# Patient Record
Sex: FEMALE | Race: WHITE
[De-identification: names, ages, dates, MRNs, and addresses within clinical notes are randomized per-mention and may not be internally consistent; named-entity substitution may affect disease eponyms.]

---

## 2017-07-08 ENCOUNTER — HOSPITAL ENCOUNTER (INPATIENT)
Dept: HOSPITAL 31 - C.ER | Age: 47
LOS: 3 days | Discharge: HOME | DRG: 301 | End: 2017-07-11
Attending: INTERNAL MEDICINE | Admitting: INTERNAL MEDICINE
Payer: MEDICAID

## 2017-07-08 DIAGNOSIS — R00.2: ICD-10-CM

## 2017-07-08 DIAGNOSIS — J98.01: ICD-10-CM

## 2017-07-08 DIAGNOSIS — Z90.710: ICD-10-CM

## 2017-07-08 DIAGNOSIS — I10: ICD-10-CM

## 2017-07-08 DIAGNOSIS — E05.90: Primary | ICD-10-CM

## 2017-07-08 DIAGNOSIS — R07.89: ICD-10-CM

## 2017-07-08 LAB
ALBUMIN SERPL-MCNC: 3.6 G/DL (ref 3.5–5)
ALBUMIN/GLOB SERPL: 1 {RATIO} (ref 1–2.1)
ALT SERPL-CCNC: 27 U/L (ref 9–52)
APTT BLD: 29 SECONDS (ref 21–34)
AST SERPL-CCNC: 26 U/L (ref 14–36)
BASOPHILS # BLD AUTO: 0 K/UL (ref 0–0.2)
BASOPHILS NFR BLD: 0.3 % (ref 0–2)
BILIRUB UR-MCNC: NEGATIVE MG/DL
BUN SERPL-MCNC: 10 MG/DL (ref 7–17)
CALCIUM SERPL-MCNC: 8.8 MG/DL (ref 8.6–10.4)
D DIMER: < 200 NG/MLDDU (ref 0–243)
EOSINOPHIL # BLD AUTO: 0.1 K/UL (ref 0–0.7)
EOSINOPHIL NFR BLD: 1.9 % (ref 0–4)
ERYTHROCYTE [DISTWIDTH] IN BLOOD BY AUTOMATED COUNT: 13.1 % (ref 11.5–14.5)
GFR NON-AFRICAN AMERICAN: > 60
GLUCOSE UR STRIP-MCNC: NORMAL MG/DL
HCG,QUALITATIVE URINE: NEGATIVE
HGB BLD-MCNC: 12.7 G/DL (ref 11–16)
INR PPP: 1
LEUKOCYTE ESTERASE UR-ACNC: (no result) LEU/UL
LYMPHOCYTES # BLD AUTO: 3.2 K/UL (ref 1–4.3)
LYMPHOCYTES NFR BLD AUTO: 49.4 % (ref 20–40)
MCH RBC QN AUTO: 30.5 PG (ref 27–31)
MCHC RBC AUTO-ENTMCNC: 33.3 G/DL (ref 33–37)
MCV RBC AUTO: 91.4 FL (ref 81–99)
MONOCYTES # BLD: 0.7 K/UL (ref 0–0.8)
MONOCYTES NFR BLD: 10 % (ref 0–10)
NEUTROPHILS # BLD: 2.5 K/UL (ref 1.8–7)
NEUTROPHILS NFR BLD AUTO: 38.4 % (ref 50–75)
NRBC BLD AUTO-RTO: 0.1 % (ref 0–2)
PH UR STRIP: 6 [PH] (ref 5–8)
PLATELET # BLD: 169 K/UL (ref 130–400)
PMV BLD AUTO: 9 FL (ref 7.2–11.7)
PROT UR STRIP-MCNC: NEGATIVE MG/DL
PROTHROMBIN TIME: 11.3 SECONDS (ref 9.7–12.2)
RBC # BLD AUTO: 4.17 MIL/UL (ref 3.8–5.2)
RBC # UR STRIP: NEGATIVE /UL
SP GR UR STRIP: 1.02 (ref 1–1.03)
SQUAMOUS EPITHIAL: 1 /HPF (ref 0–5)
URINE NITRATE: NEGATIVE
UROBILINOGEN UR-MCNC: NORMAL MG/DL (ref 0.2–1)
WBC # BLD AUTO: 6.5 K/UL (ref 4.8–10.8)

## 2017-07-09 LAB
CK MB SERPL-MCNC: 0.45 NG/ML (ref 0–3.38)
CK MB SERPL-MCNC: 0.65 NG/ML (ref 0–3.38)

## 2017-07-09 RX ADMIN — PANTOPRAZOLE SODIUM SCH MG: 40 TABLET, DELAYED RELEASE ORAL at 09:55

## 2017-07-09 RX ADMIN — ENOXAPARIN SODIUM SCH MG: 40 INJECTION SUBCUTANEOUS at 09:55

## 2017-07-09 NOTE — CP.PCM.HP
Past Patient History





- Past Social History


Smoking Status: Never Smoked





- CARDIAC


Hx Hypertension: No





- ENDOCRINE/METABOLIC


Hx Hyperthyroidism: Yes





- MUSCULOSKELETAL/RHEUMATOLOGICAL


Hx Falls: No





- GASTROINTESTINAL


Hx Constipation: Yes





- GENITOURINARY/GYNECOLOGICAL


Other/Comment: hysterectomy 1999





- PSYCHIATRIC


Hx Substance Use: No





- SURGICAL HISTORY


Hx Hysterectomy: Yes





- ANESTHESIA


Hx Anesthesia: Yes


Hx Anesthesia Reactions: No





Meds


Allergies/Adverse Reactions: 


 Allergies











Allergy/AdvReac Type Severity Reaction Status Date / Time


 


No Known Allergies Allergy   Verified 11/03/15 09:04














Physical Exam





- Constitutional


Appears: Well





- Head Exam


Head Exam: ATRAUMATIC, NORMAL INSPECTION, NORMOCEPHALIC





- Eye Exam


Eye Exam: EOMI, Normal appearance, PERRL


Pupil Exam: NORMAL ACCOMODATION, PERRL





- ENT Exam


ENT Exam: Mucous Membranes Moist, Normal Exam





- Neck Exam


Neck exam: Positive for: Normal Inspection





- Respiratory Exam


Respiratory Exam: Decreased Breath Sounds





- Cardiovascular Exam


Cardiovascular Exam: REGULAR RHYTHM, +S1, +S2





- GI/Abdominal Exam


GI & Abdominal Exam: Diminished Bowel Sounds, Soft





- Rectal Exam


Rectal Exam: Deferred





Results





- Vital Signs


Recent Vital Signs: 





 Last Vital Signs











Temp  98.2 F   07/09/17 08:52


 


Pulse  68   07/09/17 11:41


 


Resp  20   07/09/17 08:52


 


BP  136/89   07/09/17 08:52


 


Pulse Ox  97   07/09/17 08:52














- Labs


Result Diagrams: 


 07/08/17 22:09





 07/08/17 22:09


Labs: 





 Laboratory Results - last 24 hr











  07/09/17 07/09/17





  06:59 11:33


 


Total Creatine Kinase  72  73


 


CK-MB (Mass)  0.45  0.65


 


Troponin I, Quant  < 0.0120  < 0.0120


 


TSH 3rd Generation  < 0.02 L 














Assessment & Plan





- Assessment and Plan (Free Text)


Plan: 





Aspirin


Lovenox


Protonix


Continue home medication


Cardiology follow-up


Cardiac enzymes every 8


Increase methimazole to 10 mg p.o. twice daily


Free T4 T3 tomorrow morning


Endo as needed


Patient advised to see endocrinologist as an outpatient for possible radio 

iodine uptake patient is already well known case of hypothyroidism

## 2017-07-09 NOTE — CP.PCM.CON
History of Present Illness





- History of Present Illness


History of Present Illness: 





Renata Cortés 47-year-old  female with past medical history 

significant for hyperthyroidism and hypertension who presented with complaints 

of palpitations which started on Saturday afternoon around 3:00 pm .  According 

to the patient she took her dose of propanolol but her symptoms got 

progressively worse for which she came to the ER.


According to the patient she has been on propranolol and thyroid medications 

for the last 8-9 years.  She was recently prescribed diuretics by her 

endocrinologist 1 month ago, but she did not take it.  She also has ongoing 

worsening lower extremity swelling which have been there for the last 7 years 

worse at the end of the day.  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

  According to the patient she denied having any chest pains.  She does have 1-

2 pillow orthopnea and does have NYHA functional class I to II dyspnea.  She 

noticed her symptoms of dyspnea on exertion got worse over the course of last 

day.





Review of Systems





- Constitutional


Constitutional: Fatigue





- EENT


Eyes: As Per HPI


Ears: As Per HPI


Nose/Mouth/Throat: As Per HPI





- Cardiovascular


Cardiovascular: Dyspnea on Exertion, Edema, Leg Edema, Orthopnea, Rapid Heart 

Rate





- Respiratory


Respiratory: Dyspnea on Exertion





- Gastrointestinal


Gastrointestinal: As Per HPI





- Genitourinary


Genitourinary: As Per HPI





- Musculoskeletal


Musculoskeletal: As Per HPI





- Integumentary


Integumentary: As Per HPI





- Neurological


Neurological: As Per HPI





- Psychiatric


Psychiatric: As Per HPI





- Endocrine


Endocrine: Heat Intolorance, Palpitations





Past Patient History





- Past Medical History & Family History


Past Medical History?: Yes


Pertinent Family History: 





She has history of premature coronary artery disease in the family mom had MI 

at the age of 60 and dad had MI at the age of 42.  9 siblings out of which 2 

are  brother  from motor vehicle accident and sister had HIV.  Out 

of the other 7 siblings will be one sister has history of murmurs.  She is 

 with 4 kids in the age of 30 [id number] and 23.





- Past Social History


Smoking Status: Never Smoked


Alcohol: None


Drugs: Denies


Home Situation {Lives}: With Family





- CARDIAC


Hx Cardiac Disorders: No


Hx Angina: No


Hx Atrial Fibrillation: No


Hx Cardia Arrhythmia: Yes


Hx Circulatory Problems: No


Hx Congestive Heart Failure: No


Hx Heart Attack: No


Hx Hypertension: No


Hx Hypotension: No


Hx Internal Defibrillator: No


Hx Mitral Valve Prolapse: No


Hx Pacemaker: No


Hx Peripheral Edema: Yes


Hx Peripheral Vascular Disease: No





- PULMONARY


Hx Respiratory Disorders: No


Hx Asthma: No


Hx Bronchitis: No


Hx Lung Cancer: No





- ENDOCRINE/METABOLIC


Hx Endocrine Disorders: Yes


Hx Hyperthyroidism: Yes





- MUSCULOSKELETAL/RHEUMATOLOGICAL


Hx Falls: No





- GASTROINTESTINAL


Hx Constipation: Yes





- GENITOURINARY/GYNECOLOGICAL


Other/Comment: hysterectomy 1999





- PSYCHIATRIC


Hx Substance Use: No





- SURGICAL HISTORY


Hx Hysterectomy: Yes





- ANESTHESIA


Hx Anesthesia: Yes


Hx Anesthesia Reactions: No





Meds


Allergies/Adverse Reactions: 


 Allergies











Allergy/AdvReac Type Severity Reaction Status Date / Time


 


No Known Allergies Allergy   Verified 11/03/15 09:04














- Medications


Medications: 


 Current Medications





Aspirin (Aspirin)  325 mg PO DAILY [facility]


   Last Admin: 17 09:55 Dose:  325 mg


Enoxaparin Sodium (Lovenox)  40 mg SC DAILY [facility]


   Last Admin: 17 09:55 Dose:  40 mg


Methimazole (Tapazole)  10 mg PO BID [facility]


   Last Admin: 17 17:50 Dose:  10 mg


Pantoprazole Sodium (Protonix Ec Tab)  40 mg PO DAILY [facility]


   Last Admin: 17 09:55 Dose:  40 mg


Propranolol HCl (Inderal)  20 mg PO TID [facility]


   Last Admin: 17 17:49 Dose:  20 mg











Physical Exam





- Constitutional


Appears: Well, No Acute Distress





- Head Exam


Head Exam: ATRAUMATIC, NORMAL INSPECTION, NORMOCEPHALIC





- Eye Exam


Eye Exam: EOMI, Normal appearance


Pupil Exam: NORMAL ACCOMODATION, PERRL





- ENT Exam


ENT Exam: Mucous Membranes Moist, Normal Exam





- Neck Exam


Neck exam: Positive for: Normal Inspection





- Respiratory Exam


Respiratory Exam: Clear to Auscultation Bilateral, NORMAL BREATHING PATTERN





- Cardiovascular Exam


Cardiovascular Exam: Tachycardia, REGULAR RHYTHM, +S1, +S2, Systolic Murmur





- GI/Abdominal Exam


GI & Abdominal Exam: Normal Bowel Sounds, Soft





- Rectal Exam


Rectal Exam: Deferred





- Extremities Exam


Extremities exam: Positive for: normal capillary refill, normal inspection





- Back Exam


Back exam: NORMAL INSPECTION





- Neurological Exam


Neurological exam: Alert, CN II-XII Intact, Oriented x3





- Psychiatric Exam


Psychiatric exam: Normal Affect, Normal Mood





- Skin


Skin Exam: Normal Color, Warm





Results





- Vital Signs


Recent Vital Signs: 


 Last Vital Signs











Temp  98 F   17 16:16


 


Pulse  61   17 16:16


 


Resp  20   17 16:16


 


BP  143/85   17 16:16


 


Pulse Ox  99   17 16:16














- Labs


Result Diagrams: 


 17 22:09





 17 22:09


Labs: 


 Laboratory Results - last 24 hr











  17





  06:59 11:33


 


Total Creatine Kinase  72  73


 


CK-MB (Mass)  0.45  0.65


 


Troponin I, Quant  < 0.0120  < 0.0120


 


TSH 3rd Generation  < 0.02 L 














Assessment & Plan


(1) Palpitation


Assessment and Plan: 


Etiology of palpitations question secondary to hyperthyroidism versus some 

underlying atrial arrhythmia.


We will continue to monitor patient on telemetry.  Continue


We will continue home dose of propanolol 20 mg p.o. 3 times daily.





Status: Acute   





(2) SOB (shortness of breath)


Assessment and Plan: 


Etiology of shortness of breath question secondary to underlying diastolic CHF 

in the setting of tachycardia.  Will check echocardiogram.


Status: Acute   





(3) HTN (hypertension)


Assessment and Plan: 


Blood pressure mildly elevated.


Continue her on home dose of propranolol.


Depending on patient's blood pressure trend will decide if she would benefit 

from any further medications.


Status: Acute   





(4) Hyperthyroidism


Assessment and Plan: 


Check TSH free T3 and T4.


We will also check fasting lipid profile.


Status: Acute

## 2017-07-09 NOTE — RAD
PROCEDURE:  CHEST RADIOGRAPH, 1 VIEW



HISTORY:

chest pain



COMPARISON:

05/09/2016



FINDINGS:



LUNGS:

Mild venous congestion. Prominent productive change at the ends of 

the 1st ribs. Bibasilar breast and nipple shadows.  Right hilar 

prominence.



PLEURA:

No pneumothorax or pleural fluid seen.



CARDIOVASCULAR:

Normal.



OSSEOUS STRUCTURES:

Degenerative changes.



VISUALIZED UPPER ABDOMEN:

Normal.



OTHER FINDINGS:

None. 



IMPRESSION:

Mild venous congestion. Prominent productive change at the ends of 

the 1st ribs. Bibasilar breast and nipple shadows.  Right hilar 

prominence.

## 2017-07-10 VITALS — RESPIRATION RATE: 20 BRPM

## 2017-07-10 LAB
ALBUMIN SERPL-MCNC: 3.6 G/DL (ref 3.5–5)
ALBUMIN/GLOB SERPL: 1.1 {RATIO} (ref 1–2.1)
ALT SERPL-CCNC: 32 U/L (ref 9–52)
AST SERPL-CCNC: 26 U/L (ref 14–36)
BNP SERPL-MCNC: 289 PG/ML (ref 0–450)
BUN SERPL-MCNC: 7 MG/DL (ref 7–17)
CALCIUM SERPL-MCNC: 9.3 MG/DL (ref 8.6–10.4)
ERYTHROCYTE [DISTWIDTH] IN BLOOD BY AUTOMATED COUNT: 13.1 % (ref 11.5–14.5)
GFR NON-AFRICAN AMERICAN: > 60
HGB BLD-MCNC: 14 G/DL (ref 11–16)
MCH RBC QN AUTO: 30.5 PG (ref 27–31)
MCHC RBC AUTO-ENTMCNC: 33.7 G/DL (ref 33–37)
MCV RBC AUTO: 90.6 FL (ref 81–99)
PLATELET # BLD: 156 K/UL (ref 130–400)
PMV BLD AUTO: 9.4 FL (ref 7.2–11.7)
RBC # BLD AUTO: 4.59 MIL/UL (ref 3.8–5.2)
T3: 2.76 NMOL/L (ref 1.49–2.6)
T4 SERPL-MCNC: 13.6 UG/DL (ref 5.5–11)
WBC # BLD AUTO: 5.8 K/UL (ref 4.8–10.8)

## 2017-07-10 RX ADMIN — PANTOPRAZOLE SODIUM SCH MG: 40 TABLET, DELAYED RELEASE ORAL at 10:03

## 2017-07-10 RX ADMIN — ENOXAPARIN SODIUM SCH MG: 40 INJECTION SUBCUTANEOUS at 10:03

## 2017-07-10 NOTE — CP.PCM.PN
<Sera Ulrich - Last Filed: 07/10/17 19:34>





Subjective





- Date & Time of Evaluation


Date of Evaluation: 07/10/17


Time of Evaluation: 19:34





- Subjective


Subjective: 





PGY2 progress note for Dr. Shah





Pt is seen and examined at bedside.  No acute events overnight.  Patient denies 

having any CP, SOB, abd pain, N/V/D/C, palpitations.  Patient is tolerating 

diet and having regular BMs.  12 point ros are negative except for the above 

mentioned.  





Objective





- Vital Signs/Intake and Output


Vital Signs (last 24 hours): 


 











Temp Pulse Resp BP Pulse Ox


 


 98.4 F   63   20   162/95 H  99 


 


 07/10/17 15:13  07/10/17 16:00  07/10/17 15:13  07/10/17 15:13  07/10/17 15:13











- Medications


Medications: 


 Current Medications





Aspirin (Aspirin)  325 mg PO DAILY [facility]


   Last Admin: 07/10/17 10:03 Dose:  325 mg


Enoxaparin Sodium (Lovenox)  40 mg SC DAILY [facility]


   Last Admin: 07/10/17 10:03 Dose:  40 mg


Methimazole (Tapazole)  10 mg PO BID [facility]


   Last Admin: 07/10/17 17:09 Dose:  10 mg


Pantoprazole Sodium (Protonix Ec Tab)  40 mg PO DAILY [facility]


   Last Admin: 07/10/17 10:03 Dose:  40 mg


Propranolol HCl (Inderal)  20 mg PO TID [facility]


   Last Admin: 07/10/17 17:10 Dose:  20 mg











- Labs


Labs: 


 





 07/10/17 11:49 





 07/10/17 11:49 





 











PT  11.3 SECONDS (9.7-12.2)   07/08/17  22:25    


 


INR  1.0   07/08/17  22:25    


 


APTT  29 SECONDS (21-34)   07/08/17  22:25    














- Constitutional


Appears: Non-toxic, No Acute Distress





- Head Exam


Head Exam: ATRAUMATIC





- Eye Exam


Eye Exam: EOMI





- ENT Exam


ENT Exam: Mucous Membranes Moist





- Respiratory Exam


Respiratory Exam: Clear to Ausculation Bilateral.  absent: Rales, Rhonchi, 

Wheezes





- Cardiovascular Exam


Cardiovascular Exam: REGULAR RHYTHM.  absent: Gallop, Rubs, +S1, +S2, Murmur





- GI/Abdominal Exam


GI & Abdominal Exam: Soft, Normal Bowel Sounds.  absent: Distended, Firm, 

Guarding, Rigid, Tenderness, Organomegaly





- Extremities Exam


Extremities Exam: absent: Pedal Edema, Tenderness





- Neurological Exam


Neurological Exam: Alert, Awake, Oriented x3





- Psychiatric Exam


Psychiatric exam: Normal Affect, Normal Mood





- Skin


Skin Exam: Dry, Intact, Normal Color, Warm





Assessment and Plan





- Assessment and Plan (Free Text)


Assessment: 





47 [place] old female with past medical history of hyperthyroidism currently on 

methamazole and HTN is admitted for CP and palpitations. Troponins x 3 were 

negative. CXR on admission showed mild venous congestions.  Cardiology was 

consulted and stated that palpitations are likely due to hyperthyroidism.  On 

blood work, TSH was found to be <.02 and T4 was 13.6, free T4 was 1.96. 





Hyperthyroidism


- Continue Methimazole 10 mg po BID and Propanolol 20 mg po TID


- Pt follows with endocrinologist Dr. Delcid  outpatient





CP r/o ACS


- troponin x 3 negative and no ST changes on EKG so ACS less likely 





Prophylaxis


- protonix


- lovenox  





Case discussed with attending, Dr. Shah  





<Go Shah S - Last Filed: 07/10/17 20:12>





Objective





- Vital Signs/Intake and Output


Vital Signs (last 24 hours): 


 











Temp Pulse Resp BP Pulse Ox


 


 98.4 F   63   20   162/95 H  99 


 


 07/10/17 15:13  07/10/17 16:00  07/10/17 15:13  07/10/17 15:13  07/10/17 15:13











- Medications


Medications: 


 Current Medications





Aspirin (Aspirin)  325 mg PO DAILY [facility]


   Last Admin: 07/10/17 10:03 Dose:  325 mg


Enoxaparin Sodium (Lovenox)  40 mg SC DAILY [facility]


   Last Admin: 07/10/17 10:03 Dose:  40 mg


Methimazole (Tapazole)  10 mg PO BID [facility]


   Last Admin: 07/10/17 17:09 Dose:  10 mg


Pantoprazole Sodium (Protonix Ec Tab)  40 mg PO DAILY [facility]


   Last Admin: 07/10/17 10:03 Dose:  40 mg


Propranolol HCl (Inderal)  20 mg PO TID [facility]


   Last Admin: 07/10/17 17:10 Dose:  20 mg











- Labs


Labs: 


 





 07/10/17 11:49 





 07/10/17 11:49 





 











PT  11.3 SECONDS (9.7-12.2)   07/08/17  22:25    


 


INR  1.0   07/08/17  22:25    


 


APTT  29 SECONDS (21-34)   07/08/17  22:25    














Attending/Attestation





- Attestation


I have personally seen and examined this patient.: Yes


I have fully participated in the care of the patient.: Yes


I have reviewed all pertinent clinical information, including history, physical 

exam and plan: Yes


Notes (Text): 





07/10/17 20:09


Patient is admitted with the chest pain and palpitations thyroid level is very 

low patient is already on methimazole increased cardiology follow-up

## 2017-07-10 NOTE — CARD
--------------- APPROVED REPORT --------------





EKG Measurement

Heart [id number]

NH [id number]

[id number]

[id number]

[id number]



<Conclusion>

Normal sinus rhythm

Normal ECG

## 2017-07-10 NOTE — CP.PCM.PN
Subjective





- Date & Time of Evaluation


Date of Evaluation: 07/10/17


Time of Evaluation: 13:00





- Subjective


Subjective: 


clinically same





Objective





- Vital Signs/Intake and Output


Vital Signs (last 24 hours): 


 











Temp Pulse Resp BP Pulse Ox


 


 98.4 F   66   20   162/95 H  99 


 


 07/10/17 15:13  07/10/17 15:13  07/10/17 15:13  07/10/17 15:13  07/10/17 15:13











- Medications


Medications: 


 Current Medications





Aspirin (Aspirin)  325 mg PO DAILY [facility]


   Last Admin: 07/10/17 10:03 Dose:  325 mg


Enoxaparin Sodium (Lovenox)  40 mg SC DAILY [facility]


   Last Admin: 07/10/17 10:03 Dose:  40 mg


Methimazole (Tapazole)  10 mg PO BID [facility]


   Last Admin: 07/10/17 10:03 Dose:  10 mg


Pantoprazole Sodium (Protonix Ec Tab)  40 mg PO DAILY [facility]


   Last Admin: 07/10/17 10:03 Dose:  40 mg


Propranolol HCl (Inderal)  20 mg PO TID [facility]


   Last Admin: 07/10/17 14:19 Dose:  20 mg











- Labs


Labs: 


 





 07/10/17 11:49 





 07/10/17 11:49 





 











PT  11.3 SECONDS (9.7-12.2)   07/08/17  22:25    


 


INR  1.0   07/08/17  22:25    


 


APTT  29 SECONDS (21-34)   07/08/17  22:25    














- Constitutional


Appears: Well





- Head Exam


Head Exam: ATRAUMATIC, NORMAL INSPECTION, NORMOCEPHALIC





- Eye Exam


Eye Exam: EOMI, Normal appearance, PERRL


Pupil Exam: NORMAL ACCOMODATION, PERRL





- ENT Exam


ENT Exam: Mucous Membranes Moist, Normal Exam





- Neck Exam


Neck Exam: Full ROM, Normal Inspection.  absent: Lymphadenopathy





- Respiratory Exam


Respiratory Exam: Decreased Breath Sounds





- Cardiovascular Exam


Cardiovascular Exam: REGULAR RHYTHM, +S1, +S2





- GI/Abdominal Exam


GI & Abdominal Exam: Soft, Diminished Bowel Sounds





- Rectal Exam


Rectal Exam: Deferred

## 2017-07-10 NOTE — CP.PCM.PN
Subjective





- Date & Time of Evaluation


Date of Evaluation: 07/10/17


Time of Evaluation: 17:25





- Subjective


Subjective: 





feeling better


HR stable 








Objective





- Vital Signs/Intake and Output


Vital Signs (last 24 hours): 


 











Temp Pulse Resp BP Pulse Ox


 


 98.4 F   63   20   162/95 H  99 


 


 07/10/17 15:13  07/10/17 16:00  07/10/17 15:13  07/10/17 15:13  07/10/17 15:13











- Medications


Medications: 


 Current Medications





Aspirin (Aspirin)  325 mg PO DAILY [facility]


   Last Admin: 07/10/17 10:03 Dose:  325 mg


Enoxaparin Sodium (Lovenox)  40 mg SC DAILY [facility]


   Last Admin: 07/10/17 10:03 Dose:  40 mg


Methimazole (Tapazole)  10 mg PO BID [facility]


   Last Admin: 07/10/17 10:03 Dose:  10 mg


Pantoprazole Sodium (Protonix Ec Tab)  40 mg PO DAILY [facility]


   Last Admin: 07/10/17 10:03 Dose:  40 mg


Propranolol HCl (Inderal)  20 mg PO TID [facility]


   Last Admin: 07/10/17 14:19 Dose:  20 mg











- Labs


Labs: 


 





 07/10/17 11:49 





 07/10/17 11:49 





 











PT  11.3 SECONDS (9.7-12.2)   07/08/17  22:25    


 


INR  1.0   07/08/17  22:25    


 


APTT  29 SECONDS (21-34)   07/08/17  22:25    














- Constitutional


Appears: Well, No Acute Distress





- Head Exam


Head Exam: ATRAUMATIC, NORMAL INSPECTION, NORMOCEPHALIC





- Eye Exam


Eye Exam: EOMI, Normal appearance, PERRL


Pupil Exam: NORMAL ACCOMODATION





- ENT Exam


ENT Exam: Mucous Membranes Moist, Normal Exam





- Neck Exam


Neck Exam: Full ROM, Normal Inspection





- Respiratory Exam


Respiratory Exam: Clear to Ausculation Bilateral, NORMAL BREATHING PATTERN





- Cardiovascular Exam


Cardiovascular Exam: REGULAR RHYTHM, +S1, +S2.  absent: Murmur





- GI/Abdominal Exam


GI & Abdominal Exam: Soft, Normal Bowel Sounds.  absent: Tenderness





- Rectal Exam


Rectal Exam: Deferred





- Extremities Exam


Extremities Exam: Full ROM, Normal Capillary Refill, Normal Inspection.  absent

: Joint Swelling, Pedal Edema





- Back Exam


Back Exam: NORMAL INSPECTION





- Neurological Exam


Neurological Exam: Alert, Awake, CN II-XII Intact, Normal Gait, Oriented x3





- Psychiatric Exam


Psychiatric exam: Normal Affect, Normal Mood





- Skin


Skin Exam: Intact, Normal Color, Warm





Assessment and Plan


(1) Palpitation


Assessment & Plan: 


secondary to hyperthyroidism


HR stable


echo reviewed - normal LVEF 


Status: Acute   





(2) SOB (shortness of breath)


Assessment & Plan: 


2' to bronchospasm from hyperthyroidism


cont to monitor


outpt ETT 


Status: Acute   





(3) HTN (hypertension)


Assessment & Plan: 


BP stable


cont propranolol 


Status: Acute   





(4) Hyperthyroidism


Assessment & Plan: 


uncontrolled 


methimazole to be adjusted per endo 


Status: Acute

## 2017-07-11 VITALS — TEMPERATURE: 98 F | HEART RATE: 69 BPM | DIASTOLIC BLOOD PRESSURE: 88 MMHG | SYSTOLIC BLOOD PRESSURE: 141 MMHG

## 2017-07-11 LAB
ALBUMIN SERPL-MCNC: 3.9 G/DL (ref 3.5–5)
ALBUMIN/GLOB SERPL: 1.1 {RATIO} (ref 1–2.1)
ALT SERPL-CCNC: 35 U/L (ref 9–52)
AST SERPL-CCNC: 31 U/L (ref 14–36)
BUN SERPL-MCNC: 10 MG/DL (ref 7–17)
CALCIUM SERPL-MCNC: 9.7 MG/DL (ref 8.6–10.4)
ERYTHROCYTE [DISTWIDTH] IN BLOOD BY AUTOMATED COUNT: 12.8 % (ref 11.5–14.5)
GFR NON-AFRICAN AMERICAN: > 60
HGB BLD-MCNC: 14.5 G/DL (ref 11–16)
MCH RBC QN AUTO: 30.6 PG (ref 27–31)
MCHC RBC AUTO-ENTMCNC: 33.8 G/DL (ref 33–37)
MCV RBC AUTO: 90.6 FL (ref 81–99)
PLATELET # BLD: 166 K/UL (ref 130–400)
PMV BLD AUTO: 9 FL (ref 7.2–11.7)
RBC # BLD AUTO: 4.75 MIL/UL (ref 3.8–5.2)
T4 SERPL-MCNC: 16.4 UG/DL (ref 5.5–11)
WBC # BLD AUTO: 5.6 K/UL (ref 4.8–10.8)

## 2017-07-11 RX ADMIN — ENOXAPARIN SODIUM SCH MG: 40 INJECTION SUBCUTANEOUS at 09:30

## 2017-07-11 RX ADMIN — PANTOPRAZOLE SODIUM SCH MG: 40 TABLET, DELAYED RELEASE ORAL at 09:29

## 2017-07-11 NOTE — CP.PCM.PN
Subjective





- Date & Time of Evaluation


Date of Evaluation: 07/11/17


Time of Evaluation: 11:12





- Subjective


Subjective: 





PGY 2 Med Note- Dr. Cespedes's service





Pt seen and examined in no acute distress. Patient states that she feels better 

today. She admits to intermittent palpitations. She denies subjective fevers ro 

chills, chest pain, headaches, visual changes, shortness of breath, abdominal 

pain, bowel changes, paresthesias, trouble ambulating or urinary discomfort at 

this time.  Patient to be discharged today.





Objective





- Vital Signs/Intake and Output


Vital Signs (last 24 hours): 


 











Temp Pulse Resp BP Pulse Ox


 


 98 F   69   20   141/88   97 


 


 07/11/17 07:00  07/11/17 07:00  07/11/17 07:00  07/11/17 07:00  07/11/17 07:00











- Labs


Labs: 


 





 07/11/17 07:08 





 07/11/17 07:08 





 











PT  11.3 SECONDS (9.7-12.2)   07/08/17  22:25    


 


INR  1.0   07/08/17  22:25    


 


APTT  29 SECONDS (21-34)   07/08/17  22:25    














- Constitutional


Appears: Non-toxic, No Acute Distress





- Head Exam


Head Exam: ATRAUMATIC, NORMAL INSPECTION, NORMOCEPHALIC





- Eye Exam


Eye Exam: EOMI, Normal appearance, PERRL


Pupil Exam: NORMAL ACCOMODATION, PERRL





- ENT Exam


ENT Exam: Mucous Membranes Moist, Normal Exam





- Neck Exam


Neck Exam: Full ROM





- Respiratory Exam


Respiratory Exam: NORMAL BREATHING PATTERN.  absent: Wheezes





- Cardiovascular Exam


Cardiovascular Exam: Tachycardia, +S1, +S2





- GI/Abdominal Exam


GI & Abdominal Exam: Soft, Normal Bowel Sounds





- Extremities Exam


Extremities Exam: Full ROM, Normal Capillary Refill





- Back Exam


Back Exam: Full ROM





- Neurological Exam


Neurological Exam: Alert, Awake, CN II-XII Intact, Normal Gait, Oriented x3





- Psychiatric Exam


Psychiatric exam: Normal Affect, Normal Mood





- Skin


Skin Exam: Dry, Intact, Normal Color, Warm





Assessment and Plan


(1) Hyperthyroidism


Status: Chronic   





(2) Intermittent palpitations


Status: Acute   





(3) HTN (hypertension)


Status: Chronic   





(4) SOB (shortness of breath)


Status: Acute   





(5) Prophylactic measure


Status: Acute   





- Assessment and Plan (Free Text)


Assessment: 





Chest Pain r/o ACS


Status:Resolved


- troponin x 3 negative and no ST changes on EKG so ACS less likely 





Intermittent palpitations


Status: Acute   


- Palpitations noted


- Continue Methimazole 10 mg po BID and Propanolol 20 mg po TID


- Pt follows with endocrinologist Dr. Delcid  outpatient





HTN (hypertension)


Status: Acute   


-Continue Inderal


-BP stable





Hyperthyroidism


Status: Acute   


- Palpitations noted


- Continue Methimazole 10 mg po BID and Propanolol 20 mg po TID


- Pt follows with endocrinologist Dr. Delcid  outpatient





SOB (shortness of breath)


Status: Acute   


Continue to monitor.  Secondary to bronchospasm from hyperthyroidism





Prophylactic measure


Status: Acute   


- Protonix


- Lovenox 





Patient to be discharged


Case discussed with attending, Dr. Cespedes 





PT SEEN BY DR. CESPEDES TODAY AND CLEARED FOR D/C HOME.  TO CONTINUE CURRENT DOSES 

AND FREQUENCIES OF MEDS.  TO F/U WITH OWN ENDOCRINOLOGIST.  NO FURTHER ORDERS.

## 2017-07-11 NOTE — CP.PCM.PN
Subjective





- Date & Time of Evaluation


Date of Evaluation: 07/11/17


Time of Evaluation: 11:30





- Subjective


Subjective: 





had 3 episodes of palpitations today 





Objective





- Vital Signs/Intake and Output


Vital Signs (last 24 hours): 


 











Temp Pulse Resp BP Pulse Ox


 


 98 F   69   20   141/88   97 


 


 07/11/17 07:00  07/11/17 07:00  07/11/17 07:00  07/11/17 07:00  07/11/17 07:00











- Medications


Medications: 


 Current Medications





Aspirin (Aspirin)  325 mg PO DAILY [facility]


   Last Admin: 07/11/17 09:29 Dose:  325 mg


Enoxaparin Sodium (Lovenox)  40 mg SC DAILY [facility]


   Last Admin: 07/11/17 09:30 Dose:  40 mg


Methimazole (Tapazole)  10 mg PO BID [facility]


   Last Admin: 07/11/17 09:29 Dose:  10 mg


Pantoprazole Sodium (Protonix Ec Tab)  40 mg PO DAILY [facility]


   Last Admin: 07/11/17 09:29 Dose:  40 mg


Propranolol HCl (Inderal)  20 mg PO TID [facility]


   Last Admin: 07/11/17 09:29 Dose:  20 mg











- Labs


Labs: 


 





 07/11/17 07:08 





 07/11/17 07:08 





 











PT  11.3 SECONDS (9.7-12.2)   07/08/17  22:25    


 


INR  1.0   07/08/17  22:25    


 


APTT  29 SECONDS (21-34)   07/08/17  22:25    














- Constitutional


Appears: Well, No Acute Distress





- Head Exam


Head Exam: ATRAUMATIC, NORMAL INSPECTION, NORMOCEPHALIC





- Eye Exam


Eye Exam: EOMI, Normal appearance, PERRL


Pupil Exam: NORMAL ACCOMODATION, PERRL





- ENT Exam


ENT Exam: Mucous Membranes Moist, Normal Exam





- Neck Exam


Neck Exam: Full ROM, Normal Inspection.  absent: Lymphadenopathy





- Respiratory Exam


Respiratory Exam: Clear to Ausculation Bilateral, NORMAL BREATHING PATTERN





- Cardiovascular Exam


Cardiovascular Exam: REGULAR RHYTHM, +S1, +S2, Murmur





- GI/Abdominal Exam


GI & Abdominal Exam: Soft, Normal Bowel Sounds.  absent: Tenderness





- Rectal Exam


Rectal Exam: Deferred





- Extremities Exam


Extremities Exam: Full ROM, Normal Capillary Refill, Normal Inspection.  absent

: Joint Swelling, Pedal Edema





- Back Exam


Back Exam: NORMAL INSPECTION





- Neurological Exam


Neurological Exam: Alert, Awake, CN II-XII Intact, Normal Gait, Oriented x3





- Psychiatric Exam


Psychiatric exam: Normal Affect, Normal Mood





- Skin


Skin Exam: Dry, Intact, Normal Color, Warm





Assessment and Plan


(1) Palpitation


Assessment & Plan: 


2' to hyperthyroidism


will change propranolol to metoprolol in am 


cont to monitor on telemetry 


Status: Acute   





(2) SOB (shortness of breath)


Assessment & Plan: 


2' to hyperthyroid induced bronchospasm


will need ischemic evaluation once thyroid function stable 





Status: Acute   





(3) HTN (hypertension)


Assessment & Plan: 


bp stable 


cont current rx


Status: Chronic   





(4) Hyperthyroidism


Assessment & Plan: 


rx per endo 


Status: Chronic

## 2017-07-11 NOTE — CP.PCM.PN
Subjective





- Date & Time of Evaluation


Date of Evaluation: 07/11/17


Time of Evaluation: 12:42





- Subjective


Subjective: 





PT SEEN BY DR. CESPEDES TODAY AND CLEARED FOR D/C HOME.  TO CONTINUE CURRENT DOSES 

AND FREQUENCIES OF MEDS.  TO F/U WITH OWN ENDOCRINOLOGIST.  NO FURTHER ORDERS.





Objective





- Vital Signs/Intake and Output


Vital Signs (last 24 hours): 


 











Temp Pulse Resp BP Pulse Ox


 


 98 F   69   20   141/88   97 


 


 07/11/17 07:00  07/11/17 07:00  07/11/17 07:00  07/11/17 07:00  07/11/17 07:00











- Medications


Medications: 


 Current Medications





Aspirin (Aspirin)  325 mg PO DAILY [facility]


   Last Admin: 07/11/17 09:29 Dose:  325 mg


Enoxaparin Sodium (Lovenox)  40 mg SC DAILY [facility]


   Last Admin: 07/11/17 09:30 Dose:  40 mg


Methimazole (Tapazole)  10 mg PO BID [facility]


   Last Admin: 07/11/17 09:29 Dose:  10 mg


Pantoprazole Sodium (Protonix Ec Tab)  40 mg PO DAILY [facility]


   Last Admin: 07/11/17 09:29 Dose:  40 mg


Pneumococcal Polyvalent Vaccine (Pneumovax 23 Vaccine)  0.5 ml IM .ONCE ONE


   Stop: 07/12/17 10:01


Propranolol HCl (Inderal)  20 mg PO TID [facility]


   Last Admin: 07/11/17 09:29 Dose:  20 mg











- Labs


Labs: 


 





 07/11/17 07:08 





 07/11/17 07:08 





 











PT  11.3 SECONDS (9.7-12.2)   07/08/17  22:25    


 


INR  1.0   07/08/17  22:25    


 


APTT  29 SECONDS (21-34)   07/08/17  22:25

## 2017-07-11 NOTE — CP.PCM.PN
Subjective





- Date & Time of Evaluation


Date of Evaluation: 07/11/17


Time of Evaluation: 13:00





- Subjective


Subjective: 


clinically same





Objective





- Vital Signs/Intake and Output


Vital Signs (last 24 hours): 


 











Temp Pulse Resp BP Pulse Ox


 


 98 F   69   20   141/88   97 


 


 07/11/17 07:00  07/11/17 07:00  07/11/17 07:00  07/11/17 07:00  07/11/17 07:00











- Medications


Medications: 


 Current Medications





Aspirin (Aspirin)  325 mg PO DAILY [facility]


   Last Admin: 07/11/17 09:29 Dose:  325 mg


Enoxaparin Sodium (Lovenox)  40 mg SC DAILY [facility]


   Last Admin: 07/11/17 09:30 Dose:  40 mg


Methimazole (Tapazole)  10 mg PO BID [facility]


   Last Admin: 07/11/17 09:29 Dose:  10 mg


Pantoprazole Sodium (Protonix Ec Tab)  40 mg PO DAILY [facility]


   Last Admin: 07/11/17 09:29 Dose:  40 mg


Propranolol HCl (Inderal)  20 mg PO TID [facility]


   Last Admin: 07/11/17 09:29 Dose:  20 mg











- Labs


Labs: 


 





 07/11/17 07:08 





 07/11/17 07:08 





 











PT  11.3 SECONDS (9.7-12.2)   07/08/17  22:25    


 


INR  1.0   07/08/17  22:25    


 


APTT  29 SECONDS (21-34)   07/08/17  22:25    














- Constitutional


Appears: Well





- Head Exam


Head Exam: ATRAUMATIC, NORMAL INSPECTION, NORMOCEPHALIC





- Eye Exam


Eye Exam: EOMI, Normal appearance, PERRL


Pupil Exam: NORMAL ACCOMODATION, PERRL





- ENT Exam


ENT Exam: Mucous Membranes Moist, Normal Exam





- Neck Exam


Neck Exam: Full ROM, Normal Inspection.  absent: Lymphadenopathy





- Respiratory Exam


Respiratory Exam: Decreased Breath Sounds





- Cardiovascular Exam


Cardiovascular Exam: REGULAR RHYTHM, +S1, +S2





- GI/Abdominal Exam


GI & Abdominal Exam: Soft, Diminished Bowel Sounds





- Rectal Exam


Rectal Exam: Deferred

## 2017-07-12 VITALS — OXYGEN SATURATION: 100 %

## 2017-07-13 LAB — TSI ACT/NOR SER: 579 % BASELINE (ref ?–140)

## 2017-07-13 NOTE — CARD
--------------- APPROVED REPORT --------------





EXAM: Two-dimensional and M-mode echocardiogram with Doppler and 

color Doppler.



Other Information 

Quality : GoodRhythm : NSR



INDICATION

Palpitations sob



RISK FACTORS

Hypertension 



2D DIMENSIONS 

IVSd0.9   (0.7-1.1cm)Aortic Root (2D)3.4   (2.0-3.7cm)

LVDd4.6   (3.9-5.9cm)PWd0.9   (0.7-1.1cm)

LVDs3.0   (2.5-4.0cm)FS (%) 35.2   %

LVEF (%)64.6   (>50%)



M-Mode DIMENSIONS 

RVDd2.41   (2.1-3.2cm)Left Atrium (MM)3.84   (2.5-4.0cm)

IVSd1.07   (0.7-1.1cm)Aortic Root2.86   (2.2-3.7cm)

LVDd5.01   (4.0-5.6cm)Aortic Cusp Exc.2.11   (1.5-2.0cm)

PWd0.98   (0.7-1.1cm)FS (%) 42   %

LVDs2.90   (2.0-3.8cm)LVEF (%)73   (>50%)



Aortic Valve

AI P 1/2 [id number]



Mitral Valve

MV E Syhjgphj87.4cm/sMV A Jwafqtfo63.8cm/sE/A ratio1.0



TDI

E/Lateral E'0.0E/Medial E'0.0



Tricuspid Valve

TR Peak [id number]/sTR Peak [id number]



 LEFT VENTRICLE 

The left ventricle is normal size.

There is normal left ventricular wall thickness. 

The left ventricular function is normal.

The left ventricular ejection fraction is within the normal range.

No regional wall motion abnormalities noted.

Transmitral Doppler flow pattern is Grade I-abnormal relaxation 

pattern.

No left ventricle thrombus noted on this study.

There is no ventricular septal defect visualized.

There is no left ventricular aneurysm. 

There is no mass noted in the left ventricle.



 RIGHT VENTRICLE 

The right ventricle is normal size.

There is normal right ventricular wall thickness.

The right ventricular systolic function is normal.



 ATRIA 

The left atrium size is normal.

The right atrium size is normal.

The interatrial septum is intact with no evidence for an atrial 

septal defect.



 AORTIC VALVE 

The aortic valve is normal in structure.

There is mild aortic regurgitation.

There is no aortic valvular stenosis. 

There is no aortic valvular vegetation.



 MITRAL VALVE 

The mitral valve is normal in structure.

There is no evidence of mitral valve prolapse.

There is no mitral valve stenosis.

Mitral regurgitation is mild



 TRICUSPID VALVE 

The tricuspid valve is normal in structure.

There is mild to moderate tricuspid regurgitation.

There is no tricuspid valve prolapse or vegetation.

There is no tricuspid valve stenosis. 



 PULMONIC VALVE 

The pulmonary valve is normal in structure.

There is mild pulmonic valvular regurgitation. 

There is no pulmonic valvular stenosis.



 GREAT VESSELS 

The aortic root is normal in size.

The ascending aorta is normal in size.

The pulmonary artery is normal.

The IVC is normal in size and collapses >50% with inspiration.



 PERICARDIAL EFFUSION 

The pericardium appears normal.

There is no pleural effusion.



<Conclusion>

The left ventricular ejection fraction is within the normal range.

Transmitral Doppler flow pattern is Grade I-abnormal relaxation 

pattern.

There is mild aortic regurgitation.

Mitral regurgitation is mild

There is mild to moderate tricuspid regurgitation.

There is mild pulmonic valvular regurgitation.

## 2019-01-24 NOTE — C.PDOC
History Of Present Illness


47 year old female with a Hx of hyperthyroidism, hypertensionwho presents to 

the ER with a complaint of palpitations and chest painthat began this 

afternoon. Denies fever or other physical complaints.


Time Seen by Provider: 07/08/17 21:48


Chief Complaint (Nursing): Palpitations


History Per: Patient


History/Exam Limitations: no limitations


Onset/Duration Of Symptoms: Hrs


Current Symptoms Are (Timing): Still Present


Associated Symptoms: denies: Chest Pain, Dyspnea, Dizziness, Headache


Quality Of Symptoms: Rapid Heart Rate


Exacerbating Factor(s): Pos: None


Recent travel outside of the [place]: No





Past Medical History


Reviewed: Historical Data, Nursing Documentation, Vital Signs


Vital Signs: 


 Last Vital Signs











Temp  98 F   07/11/17 07:00


 


Pulse  69   07/11/17 07:00


 


Resp  20   07/11/17 07:00


 


BP  141/88   07/11/17 07:00


 


Pulse Ox  97   07/11/17 07:00














- Medical History


PMH: HTN, Hyperthyroidism


Surgical History: No Surg Hx


Family History: States: Unknown Family Hx





- Social History


Hx Tobacco Use: No


Hx Alcohol Use: No


Hx Substance Use: No





- Immunization History


Hx Tetanus Toxoid Vaccination: Yes


Hx Influenza Vaccination: Yes (2016)


Hx Pneumococcal Vaccination: No





Review Of Systems


Except As Marked, All Systems Reviewed And Found Negative.


Constitutional: Negative for: Fever


Cardiovascular: Positive for: Palpitations.  Negative for: Chest Pain


Respiratory: Negative for: Cough, Shortness of Breath, Wheezing


Gastrointestinal: Negative for: Vomiting, Abdominal Pain


Neurological: Negative for: Weakness, Numbness





Physical Exam





- Physical Exam


Appears: Non-toxic, No Acute Distress


Skin: Normal Color, Warm, Dry


Head: Atraumatic, Normacephalic


Oral Mucosa: Moist


Chest: Symmetrical, No Tenderness


Cardiovascular: Rhythm Regular, No Murmur


Respiratory: Normal Breath Sounds, No Rales, No Rhonchi, No Wheezing


Gastrointestinal/Abdominal: Soft, No Tenderness


Neurological/Psych: Oriented x3, Normal Speech, Normal Cognition





ED Course And Treatment





- Laboratory Results


Result Diagrams: 


 07/11/17 07:08





 07/11/17 07:08


O2 Sat by Pulse Oximetry: 100 (Room air)


Pulse Ox Interpretation: Normal





Medical Decision Making


Medical Decision Making: 





Impression: 47 year old female with palpitationsand chest pain - r/o acs. 





Plan:


* EKG


* Blood work


* CXR


* Urinalysis 


* 


* 1200: labs unremakrable. as pt presetns < 6 hrs from onset, will obs for cp. 





Disposition





- Disposition


Disposition: HOSPITALIZED


Disposition Time: 11:00


Condition: STABLE





- Clinical Impression


Clinical Impression: 


 Palpitations, Chest pain








- Scribe Statement


The provider has reviewed the documentation as recorded by the Scribe





Juan Blake





All medical record entries made by the Scribe were at my direction and 

personally dictated by me. I have reviewed the chart and agree that the record 

accurately reflects my personal performance of the history, physical exam, 

medical decision making, and the department course for this patient. I have 

also personally directed, reviewed, and agree with the discharge instructions 

and disposition.





Decision To Admit





- Pt Status Changed To:


Hospital Disposition Of: Observation





- .


Bed Request Type: Telemetry


Admitting Physician: Go Shah


Patient Diagnosis: 


 Palpitations, Chest pain How Severe Are Your Spot(S)?: mild Have Your Spot(S) Been Treated In The Past?: has not been treated Hpi Title: Evaluation of Skin Lesions Location: Left anterior shoulder Year Removed: 2018

## 2019-03-30 ENCOUNTER — HOSPITAL ENCOUNTER (INPATIENT)
Dept: HOSPITAL 31 - C.ER | Age: 49
LOS: 3 days | Discharge: HOME | DRG: 125 | End: 2019-04-02
Attending: INTERNAL MEDICINE | Admitting: INTERNAL MEDICINE
Payer: COMMERCIAL

## 2019-03-30 DIAGNOSIS — E78.5: ICD-10-CM

## 2019-03-30 DIAGNOSIS — E05.90: ICD-10-CM

## 2019-03-30 DIAGNOSIS — I10: ICD-10-CM

## 2019-03-30 DIAGNOSIS — Z79.82: ICD-10-CM

## 2019-03-30 DIAGNOSIS — Z79.899: ICD-10-CM

## 2019-03-30 DIAGNOSIS — Z90.710: ICD-10-CM

## 2019-03-30 DIAGNOSIS — R07.89: Primary | ICD-10-CM

## 2019-03-30 LAB
ALBUMIN SERPL-MCNC: 4.3 {NULL, G/DL} (ref 3.5–5)
ALBUMIN/GLOB SERPL: 1.4 {NULL, NULL} (ref 1–2.1)
ALT SERPL-CCNC: 13 {NULL, U/L} (ref 9–52)
AST SERPL-CCNC: 26 {NULL, U/L} (ref 14–36)
BASOPHILS # BLD AUTO: 0 {NULL, K/UL} (ref 0–0.2)
BASOPHILS NFR BLD: 0.6 {NULL, %} (ref 0–2)
BUN SERPL-MCNC: 8 {NULL, MG/DL} (ref 7–17)
CALCIUM SERPL-MCNC: 9.4 {NULL, MG/DL} (ref 8.6–10.4)
EOSINOPHIL # BLD AUTO: 0.1 {NULL, K/UL} (ref 0–0.7)
EOSINOPHIL NFR BLD: 1.6 {NULL, %} (ref 0–4)
ERYTHROCYTE [DISTWIDTH] IN BLOOD BY AUTOMATED COUNT: 13.4 {NULL, %} (ref 11.5–14.5)
GFR NON-AFRICAN AMERICAN: > 60 {NULL, NULL}
HGB BLD-MCNC: 13.9 {NULL, G/DL} (ref 11–16)
LYMPHOCYTES # BLD AUTO: 3.6 {NULL, K/UL} (ref 1–4.3)
LYMPHOCYTES NFR BLD AUTO: 43.4 {NULL, %} (ref 20–40)
MCH RBC QN AUTO: 33 {NULL, PG} (ref 27–31)
MCHC RBC AUTO-ENTMCNC: 34.1 {NULL, G/DL} (ref 33–37)
MCV RBC AUTO: 96.7 {NULL, FL} (ref 81–99)
MONOCYTES # BLD: 0.8 {NULL, K/UL} (ref 0–0.8)
MONOCYTES NFR BLD: 9.2 {NULL, %} (ref 0–10)
NEUTROPHILS # BLD: 3.7 {NULL, K/UL} (ref 1.8–7)
NEUTROPHILS NFR BLD AUTO: 45.2 {NULL, %} (ref 50–75)
NRBC BLD AUTO-RTO: 0 {NULL, %} (ref 0–2)
PLATELET # BLD: 170 {NULL, K/UL} (ref 130–400)
PMV BLD AUTO: 8.8 {NULL, FL} (ref 7.2–11.7)
RBC # BLD AUTO: 4.21 {NULL, MIL/UL} (ref 3.8–5.2)
WBC # BLD AUTO: 8.2 {NULL, K/UL} (ref 4.8–10.8)

## 2019-03-30 RX ADMIN — ENOXAPARIN SODIUM SCH MG: 40 INJECTION SUBCUTANEOUS at 09:11

## 2019-03-30 NOTE — CT
Date of service: 



03/30/2019



PROCEDURE:  CT Chest with contrast (Pulmonary Angiogram)



HISTORY:

cp



COMPARISON:

None available.



TECHNIQUE:

Axial computed tomography images were obtained of the chest in the 

pulmonary arterial phase of enhancement. Coronal and sagittal 

reformatted images were created and reviewed.



Intravenous contrast dose: 100 mL of Visipaque 320 intravenously.



Radiation dose:



Total exam DLP = 611.75 mGy-cm.



This CT exam was performed using one or more of the following dose 

reduction techniques: Automated exposure control, adjustment of the 

mA and/or kV according to patient size, and/or use of iterative 

reconstruction technique.



FINDINGS:



PULMONARY ARTERIES:

No evidence of filling defect in the visualized pulmonary arteries to 

suggest acute pulmonary embolus.  



AORTA:

No acute findings. No thoracic aortic aneurysm. No aortic 

atherosclerotic calcification or mural plaque present.



LUNGS:

Mild pulmonary vascular congestion noted. There is a 6 millimeter 

nodule at the anterior aspect of the right lung base. 



PLEURAL SPACES:

Unremarkable. No effusion or pneumothorax. 



HEART:

The heart is mildly enlarged.  No evidence of pericardial effusion. 



LYMPH NODES:

No lymphadenopathy.



BONES, CHEST WALL:

Unremarkable. No fracture or destructive lesion 



OTHER FINDINGS:

Unremarkable. 



IMPRESSION:

No evidence of acute pulmonary embolus.



Mild cardiomegaly and mild pulmonary vascular congestion.



Preliminary report was submitted by [facility] contains concordant 

findings.

## 2019-03-30 NOTE — C.PDOC
History Of Present Illness


49 y/o female pt with hx of HTN, hyperthyroidism and family hx of cardiac 

problems presents to the ER c/o chest pain for x1.5 days ago. Pt reports chest 

pain radiates to the upper left posterior shoulder. Pt notes pain is worse when 

taking a deep breath. Pt took an aspirin this morning. Pt denies radiation to 

the back, SOB, leg swelling, headache and abdominal pain. 


Time Seen by Provider: 19 01:31


Chief Complaint (Nursing): Chest Pain


History Per: Patient


History/Exam Limitations: no limitations


Onset/Duration Of Symptoms: Days (1.5)


Current Symptoms Are (Timing): Still Present





Past Medical History


Reviewed: Historical Data, Nursing Documentation, Vital Signs


Vital Signs: 





                                Last Vital Signs











Temp  98.1 F   19 00:38


 


Pulse  71   19 00:38


 


Resp  16   19 00:38


 


BP  183/117 H  19 00:38


 


Pulse Ox  100   19 00:38














- Medical History


PMH: Cardia Arrhythmia, HTN, Hyperthyroidism, Peripheral Edema


Family History: States: Unknown Family Hx





- Social History


Hx Tobacco Use: No


Hx Alcohol Use: No


Hx Substance Use: No





- Immunization History


Hx Tetanus Toxoid Vaccination: Yes


Hx Influenza Vaccination: Yes (2016)


Hx Pneumococcal Vaccination: No





Review Of Systems


Cardiovascular: Positive for: Chest Pain


Respiratory: Negative for: Shortness of Breath


Gastrointestinal: Negative for: Abdominal Pain


Musculoskeletal: Positive for: Shoulder Pain (upper left posterior; radiated 

from chest pain ).  Negative for: Back Pain, Other (leg swelling )





Physical Exam





- Physical Exam


Appears: Non-toxic, No Acute Distress


Skin: Normal Color, Warm, Dry


Head: Atraumatic, Normacephalic


Eye(s): bilateral: Normal Inspection, PERRL, EOMI


Nose: Normal


Oral Mucosa: Moist


Neck: Trachea Midline, Supple, Other (no meningeal signs; negative kernig's and 

brudzinski's)


Chest: Symmetrical


Cardiovascular: Rhythm Regular, No Friction Rub


Respiratory: No Rales, No Rhonchi, No Wheezing


Gastrointestinal/Abdominal: Soft, No Tenderness


Extremity: Bilateral: Atraumatic, Normal Color And Temperature, Normal ROM


Neurological/Psych: Oriented x3, Normal Speech





ED Course And Treatment





- Laboratory Results


Result Diagrams: 


                                 19 01:31





                                 19 01:31


ECG: Interpreted By Me, Viewed By Me


ECG Rhythm: Sinus Rhythm


ECG Interpretation: Normal


Interpretation Of ECG: no STEMI


Rate From EC


O2 Sat by Pulse Oximetry: 100 (RA)


Pulse Ox Interpretation: Normal





Medical Decision Making


Medical Decision Makin yr old F w/ hx of HTN, family hx of heart issues p/w chest pain. PT notes 

taking ASA already today. PLeuritic type chest pain and somewhat typical / class

ic chest pain without diaphoresis. Likely moderate heart score. Will seek CTPE 

rule out given pleurtic pain





EK 


Trop: 0


Story: 1


RF: 2 


Age: 1





plans: 


-- chem labs 


-- blood work 





0459


alina accepted 


trop labs unremakrable


pt in NAD, agreeable to plan


pending CT pe reading 





0545


CTPE negative





Disposition





- Disposition


Disposition Time: 05:00


Condition: STABLE





- Clinical Impression


Clinical Impression: 


 Chest pain








- Scribe Statement


The provider has reviewed the documentation as recorded by the Scribe


Danna Pedro


Provider Attestation: 


All medical record entries made by the Scribe were at my direction and 

personally dictated by me. I have reviewed the chart and agree that the record 

accurately reflects my personal performance of the history, physical exam, 

medical decision making, and the department course for this patient. I have also

personally directed, reviewed, and agree with the discharge instructions and 

disposition.

## 2019-03-31 VITALS — RESPIRATION RATE: 20 BRPM

## 2019-03-31 LAB
ALBUMIN SERPL-MCNC: 4.3 {NULL, G/DL} (ref 3.5–5)
ALBUMIN/GLOB SERPL: 1.4 {NULL, NULL} (ref 1–2.1)
ALT SERPL-CCNC: 10 {NULL, U/L} (ref 9–52)
AST SERPL-CCNC: 22 {NULL, U/L} (ref 14–36)
BASOPHILS # BLD AUTO: 0 {NULL, K/UL} (ref 0–0.2)
BASOPHILS NFR BLD: 0.3 {NULL, %} (ref 0–2)
BUN SERPL-MCNC: 9 {NULL, MG/DL} (ref 7–17)
CALCIUM SERPL-MCNC: 9.5 {NULL, MG/DL} (ref 8.6–10.4)
EOSINOPHIL # BLD AUTO: 0.1 {NULL, K/UL} (ref 0–0.7)
EOSINOPHIL NFR BLD: 1.7 {NULL, %} (ref 0–4)
ERYTHROCYTE [DISTWIDTH] IN BLOOD BY AUTOMATED COUNT: 13.4 {NULL, %} (ref 11.5–14.5)
GFR NON-AFRICAN AMERICAN: > 60 {NULL, NULL}
HGB BLD-MCNC: 15.3 {NULL, G/DL} (ref 11–16)
LYMPHOCYTES # BLD AUTO: 2.4 {NULL, K/UL} (ref 1–4.3)
LYMPHOCYTES NFR BLD AUTO: 37.9 {NULL, %} (ref 20–40)
MCH RBC QN AUTO: 33.7 {NULL, PG} (ref 27–31)
MCHC RBC AUTO-ENTMCNC: 34.6 {NULL, G/DL} (ref 33–37)
MCV RBC AUTO: 97.3 {NULL, FL} (ref 81–99)
MONOCYTES # BLD: 0.5 {NULL, K/UL} (ref 0–0.8)
MONOCYTES NFR BLD: 7.4 {NULL, %} (ref 0–10)
NEUTROPHILS # BLD: 3.4 {NULL, K/UL} (ref 1.8–7)
NEUTROPHILS NFR BLD AUTO: 52.7 {NULL, %} (ref 50–75)
NRBC BLD AUTO-RTO: 0.2 {NULL, %} (ref 0–2)
PLATELET # BLD: 198 {NULL, K/UL} (ref 130–400)
PMV BLD AUTO: 8.9 {NULL, FL} (ref 7.2–11.7)
RBC # BLD AUTO: 4.56 {NULL, MIL/UL} (ref 3.8–5.2)
WBC # BLD AUTO: 6.4 {NULL, K/UL} (ref 4.8–10.8)

## 2019-03-31 RX ADMIN — ENOXAPARIN SODIUM SCH MG: 40 INJECTION SUBCUTANEOUS at 09:40

## 2019-03-31 NOTE — CP.PCM.PN
Subjective





- Date & Time of Evaluation


Date of Evaluation: 03/31/19


Time of Evaluation: 09:30





- Subjective


Subjective: 


Patient seen and evaluated





Review Of Systems


Cardiovascular: Positive for: Chest Pain


Respiratory: Negative for: Shortness of Breath


Gastrointestinal: Negative for: Abdominal Pain


Musculoskeletal: Positive for: Shoulder Pain (upper left posterior; radiated 

from chest pain ).  Negative for: Back Pain, Other (leg swelling )





Physical Exam





- Physical Exam


Appears: Non-toxic, No Acute Distress


Skin: Normal Color, Warm, Dry


Head: Atraumatic, Normacephalic


Eye(s): bilateral: Normal Inspection, PERRL, EOMI


Nose: Normal


Oral Mucosa: Moist


Neck: Trachea Midline, Supple, Other (no meningeal signs; negative kernig's and 

brudzinski's)


Chest: Symmetrical


Cardiovascular: Rhythm Regular, No Friction Rub


Respiratory: No Rales, No Rhonchi, No Wheezing


Gastrointestinal/Abdominal: Soft, No Tenderness


Extremity: Bilateral: Atraumatic, Normal Color And Temperature, Normal ROM


Neurological/Psych: Oriented x3, Normal Speech





Assessment and Plan


48 F with hx of HTN, hyperlipidemia and Fhx of heart issues admitted for chest 

pain


Stress test and ECHO tomorrow














Objective





- Vital Signs/Intake and Output


Vital Signs (last 24 hours): 


                                        











Temp Pulse Resp BP Pulse Ox


 


 97.6 F   67   20   149/96 H  98 


 


 03/31/19 08:00  03/31/19 21:00  03/31/19 21:00  03/31/19 21:00  03/31/19 21:00











- Medications


Medications: 


                               Current Medications





Aspirin (Ecotrin)  81 mg PO DAILY [facility]


   Last Admin: 03/31/19 09:39 Dose:  81 mg


Enoxaparin Sodium (Lovenox)  40 mg SC DAILY [facility]


   Last Admin: 03/31/19 09:40 Dose:  40 mg


Losartan Potassium (Cozaar)  50 mg PO DAILY [facility]


Methimazole (Tapazole)  10 mg PO BID [facility]


   Last Admin: 03/31/19 17:15 Dose:  10 mg


Pneumococcal Polyvalent Vaccine (Pneumovax 23 Vaccine)  0.5 ml IM .ONCE ONE


   Stop: 04/01/19 10:01


Propranolol HCl (Inderal)  20 mg PO BID [facility]


   Last Admin: 03/31/19 17:13 Dose:  20 mg


Rosuvastatin Calcium (Crestor)  10 mg PO [facility]


   Last Admin: 03/31/19 21:35 Dose:  10 mg











- Labs


Labs: 


                                        





                                 03/31/19 07:50 





                                 03/31/19 07:50

## 2019-03-31 NOTE — CP.PCM.CON
History of Present Illness





- History of Present Illness


History of Present Illness: 





47 y/o female pt with hx of HTN, hyperthyroidism and family hx of cardiac 

problems presents to the ER c/o chest pain for x1.5 days ago. Pt reports chest 

pain radiates to the upper left posterior shoulder. Pt notes pain is worse when 

taking a deep breath. Pt took an aspirin this morning. Pt denies radiation to 

the back, SOB, leg swelling, headache and abdominal pain. 





Chief Complaint (Nursing): Chest Pain


History Per: Patient


History/Exam Limitations: no limitations


Onset/Duration Of Symptoms: Days (1.5)


Current Symptoms Are (Timing): Still Present





Past Medical History


Reviewed: Historical Data, Nursing Documentation, Vital Signs


Vital Signs: 





                                Last Vital Signs











Temp  98.1 F   03/30/19 00:38


 


Pulse  71   03/30/19 00:38


 


Resp  16   03/30/19 00:38


 


BP  183/117 H  03/30/19 00:38


 


Pulse Ox  100   03/30/19 00:38














- Medical History


PMH: Cardia Arrhythmia, HTN, Hyperthyroidism, Peripheral Edema


Family History: States: Unknown Family Hx





- Social History


Hx Tobacco Use: No


Hx Alcohol Use: No


Hx Substance Use: No





- Immunization History


Hx Tetanus Toxoid Vaccination: Yes


Hx Influenza Vaccination: Yes (2016)


Hx Pneumococcal Vaccination: No





Review Of Systems


Cardiovascular: Positive for: Chest Pain


Respiratory: Negative for: Shortness of Breath


Gastrointestinal: Negative for: Abdominal Pain


Musculoskeletal: Positive for: Shoulder Pain (upper left posterior; radiated 

from chest pain ).  Negative for: Back Pain, Other (leg swelling )





Physical Exam





- Physical Exam


Appears: Non-toxic, No Acute Distress


Skin: Normal Color, Warm, Dry


Head: Atraumatic, Normacephalic


Eye(s): bilateral: Normal Inspection, PERRL, EOMI


Nose: Normal


Oral Mucosa: Moist


Neck: Trachea Midline, Supple, Other (no meningeal signs; negative kernig's and 

brudzinski's)


Chest: Symmetrical


Cardiovascular: Rhythm Regular, No Friction Rub


Respiratory: No Rales, No Rhonchi, No Wheezing


Gastrointestinal/Abdominal: Soft, No Tenderness


Extremity: Bilateral: Atraumatic, Normal Color And Temperature, Normal ROM


Neurological/Psych: Oriented x3, Normal Speech











Assessment and Plan


48 F with hx of HTN, hyperlipidemia and Fhx of heart issues admitted for chest 

pain


Stress test and ECHO Monday prior to discharge











Past Patient History





- Past Medical History & Family History


Past Medical History?: Yes





- Past Social History


Smoking Status: Never Smoked





- CARDIAC


Hx Cardia Arrhythmia: Yes


Hx Hypertension: Yes


Hx Peripheral Edema: Yes





- PULMONARY


Hx Asthma: No


Hx Bronchitis: No





- ENDOCRINE/METABOLIC


Hx Hyperthyroidism: Yes





- MUSCULOSKELETAL/RHEUMATOLOGICAL


Hx Falls: No





- GASTROINTESTINAL


Hx Constipation: Yes





- GENITOURINARY/GYNECOLOGICAL


Other/Comment: hysterectomy 1999





- PSYCHIATRIC


Hx Substance Use: No





- SURGICAL HISTORY


Hx Surgeries: Yes


Hx Hysterectomy: Yes





- ANESTHESIA


Hx Anesthesia: Yes


Hx Anesthesia Reactions: No





Meds


Allergies/Adverse Reactions: 


                                    Allergies











Allergy/AdvReac Type Severity Reaction Status Date / Time


 


No Known Allergies Allergy   Verified 03/30/19 00:42














- Medications


Medications: 


                               Current Medications





Aspirin (Ecotrin)  81 mg PO DAILY [facility]


   Last Admin: 03/30/19 09:11 Dose:  81 mg


Enoxaparin Sodium (Lovenox)  40 mg SC DAILY [facility]


   Last Admin: 03/30/19 09:11 Dose:  40 mg


Losartan Potassium (Cozaar)  25 mg PO DAILY [facility]


   Last Admin: 03/30/19 14:51 Dose:  25 mg


Methimazole (Tapazole)  10 mg PO BID [facility]


   Last Admin: 03/30/19 18:31 Dose:  10 mg


Pneumococcal Polyvalent Vaccine (Pneumovax 23 Vaccine)  0.5 ml IM .ONCE ONE


   Stop: 04/01/19 10:01


Propranolol HCl (Inderal)  20 mg PO BID [facility]


   Last Admin: 03/30/19 18:31 Dose:  20 mg


Rosuvastatin Calcium (Crestor)  10 mg PO HS [facility]


   Last Admin: 03/30/19 21:38 Dose:  10 mg











Results





- Vital Signs


Recent Vital Signs: 


                                Last Vital Signs











Temp  97.6 F   03/31/19 08:00


 


Pulse  64   03/31/19 08:00


 


Resp  20   03/31/19 08:00


 


BP  148/99 H  03/31/19 08:00


 


Pulse Ox  96   03/31/19 08:00














- Labs


Result Diagrams: 


                                 03/31/19 07:50





                                 03/31/19 07:50


Labs: 


                         Laboratory Results - last 24 hr











  03/30/19 03/30/19 03/31/19





  09:28 16:58 07:50


 


WBC    6.4


 


RBC    4.56


 


Hgb    15.3


 


Hct    44.3


 


MCV    97.3


 


MCH    33.7 H


 


MCHC    34.6


 


RDW    13.4


 


Plt Count    198


 


MPV    8.9


 


Neut % (Auto)    52.7


 


Lymph % (Auto)    37.9


 


Mono % (Auto)    7.4


 


Eos % (Auto)    1.7


 


Baso % (Auto)    0.3


 


Neut # (Auto)    3.4


 


Lymph # (Auto)    2.4


 


Mono # (Auto)    0.5


 


Eos # (Auto)    0.1


 


Baso # (Auto)    0.0


 


Sodium   


 


Potassium   


 


Chloride   


 


Carbon Dioxide   


 


Anion Gap   


 


BUN   


 


Creatinine   


 


Est GFR ( Amer)   


 


Est GFR (Non-Af Amer)   


 


Random Glucose   


 


Calcium   


 


Total Bilirubin   


 


AST   


 


ALT   


 


Alkaline Phosphatase   


 


Troponin I  < 0.0120  < 0.0120 


 


Total Protein   


 


Albumin   


 


Globulin   


 


Albumin/Globulin Ratio   














  03/31/19





  07:50


 


WBC 


 


RBC 


 


Hgb 


 


Hct 


 


MCV 


 


MCH 


 


MCHC 


 


RDW 


 


Plt Count 


 


MPV 


 


Neut % (Auto) 


 


Lymph % (Auto) 


 


Mono % (Auto) 


 


Eos % (Auto) 


 


Baso % (Auto) 


 


Neut # (Auto) 


 


Lymph # (Auto) 


 


Mono # (Auto) 


 


Eos # (Auto) 


 


Baso # (Auto) 


 


Sodium  136


 


Potassium  3.9


 


Chloride  102


 


Carbon Dioxide  27


 


Anion Gap  11


 


BUN  9


 


Creatinine  0.6 L


 


Est GFR ( Amer)  > 60


 


Est GFR (Non-Af Amer)  > 60


 


Random Glucose  114 H


 


Calcium  9.5


 


Total Bilirubin  0.7


 


AST  22


 


ALT  10


 


Alkaline Phosphatase  99


 


Troponin I 


 


Total Protein  7.5


 


Albumin  4.3


 


Globulin  3.2


 


Albumin/Globulin Ratio  1.4

## 2019-03-31 NOTE — CP.PCM.HP
Present on Admission





- Present on Admission


Any Indicators Present on Admission: No





Past Patient History





- Past Medical History & Family History


Past Medical History?: Yes





- Past Social History


Smoking Status: Never Smoked





- CARDIAC


Hx Cardia Arrhythmia: Yes


Hx Hypertension: Yes


Hx Peripheral Edema: Yes





- PULMONARY


Hx Asthma: No


Hx Bronchitis: No





- ENDOCRINE/METABOLIC


Hx Hyperthyroidism: Yes





- MUSCULOSKELETAL/RHEUMATOLOGICAL


Hx Falls: No





- GASTROINTESTINAL


Hx Constipation: Yes





- GENITOURINARY/GYNECOLOGICAL


Other/Comment: hysterectomy 1999





- PSYCHIATRIC


Hx Substance Use: No





- SURGICAL HISTORY


Hx Surgeries: Yes


Hx Hysterectomy: Yes





- ANESTHESIA


Hx Anesthesia: Yes


Hx Anesthesia Reactions: No





Meds


Allergies/Adverse Reactions: 


                                    Allergies











Allergy/AdvReac Type Severity Reaction Status Date / Time


 


No Known Allergies Allergy   Verified 03/30/19 00:42














Results





- Vital Signs


Recent Vital Signs: 





                                Last Vital Signs











Temp  97.6 F   03/31/19 08:00


 


Pulse  67   03/31/19 21:00


 


Resp  20   03/31/19 21:00


 


BP  149/96 H  03/31/19 21:00


 


Pulse Ox  98   03/31/19 21:00














- Labs


Result Diagrams: 


                                 03/31/19 07:50





                                 03/31/19 07:50


Labs: 





                         Laboratory Results - last 24 hr











  03/31/19 03/31/19





  07:50 07:50


 


WBC  6.4 


 


RBC  4.56 


 


Hgb  15.3 


 


Hct  44.3 


 


MCV  97.3 


 


MCH  33.7 H 


 


MCHC  34.6 


 


RDW  13.4 


 


Plt Count  198 


 


MPV  8.9 


 


Neut % (Auto)  52.7 


 


Lymph % (Auto)  37.9 


 


Mono % (Auto)  7.4 


 


Eos % (Auto)  1.7 


 


Baso % (Auto)  0.3 


 


Neut # (Auto)  3.4 


 


Lymph # (Auto)  2.4 


 


Mono # (Auto)  0.5 


 


Eos # (Auto)  0.1 


 


Baso # (Auto)  0.0 


 


Sodium   136


 


Potassium   3.9


 


Chloride   102


 


Carbon Dioxide   27


 


Anion Gap   11


 


BUN   9


 


Creatinine   0.6 L


 


Est GFR ( Amer)   > 60


 


Est GFR (Non-Af Amer)   > 60


 


Random Glucose   114 H


 


Calcium   9.5


 


Total Bilirubin   0.7


 


AST   22


 


ALT   10


 


Alkaline Phosphatase   99


 


Total Protein   7.5


 


Albumin   4.3


 


Globulin   3.2


 


Albumin/Globulin Ratio   1.4

## 2019-04-01 RX ADMIN — ENOXAPARIN SODIUM SCH: 40 INJECTION SUBCUTANEOUS at 11:00

## 2019-04-01 RX ADMIN — ENOXAPARIN SODIUM SCH MG: 40 INJECTION SUBCUTANEOUS at 13:20

## 2019-04-01 NOTE — CP.PCM.PN
Subjective





- Date & Time of Evaluation


Date of Evaluation: 04/01/19


Time of Evaluation: 08:20





- Subjective


Subjective: 





dictated   





Objective





- Vital Signs/Intake and Output


Vital Signs (last 24 hours): 


                                        











Temp Pulse Resp BP Pulse Ox


 


 97.3 F L  100 H  20   164/116 H  98 


 


 04/01/19 15:20  04/01/19 16:00  04/01/19 15:20  04/01/19 15:20  04/01/19 15:20











- Medications


Medications: 


                               Current Medications





Aspirin (Ecotrin)  81 mg PO DAILY [facility]


   Last Admin: 04/01/19 13:19 Dose:  81 mg


Enoxaparin Sodium (Lovenox)  40 mg SC DAILY [facility]


   Last Admin: 04/01/19 13:20 Dose:  40 mg


Losartan Potassium (Cozaar)  50 mg PO DAILY [facility]


   Last Admin: 04/01/19 13:19 Dose:  50 mg


Methimazole (Tapazole)  10 mg PO BID [facility]


   Last Admin: 04/01/19 17:43 Dose:  10 mg


Propranolol HCl (Inderal)  20 mg PO BID [facility]


   Last Admin: 04/01/19 17:43 Dose:  20 mg


Rosuvastatin Calcium (Crestor)  10 mg PO HS [facility]


   Last Admin: 04/01/19 21:40 Dose:  10 mg











- Labs


Labs: 


                                        





                                 03/31/19 07:50 





                                 03/31/19 07:50

## 2019-04-01 NOTE — HP
CHIEF COMPLAINT:  Chest pain.



HISTORY OF PRESENT ILLNESS:  This is a 48-year-old  female,

nonsmoker, non-ETOH user with history of hypertension, hyperthyroidism. 

She has someone with cardiac problems in the family who complained of chest

pain for one and a half days.  According to the patient, the chest pain is

left upper precordial, nonradiating, not associated with diaphoresis or

dizziness.  According to her, the pain is worse with deep inspiration and

left arm movement.  According to her, she took aspirin this morning, and

she is not sure this is improved.  She denies any radiation pain to the

left arm or back.  She denies any history of cough, sore throat, or runny

nose.  She denies any history of dyspepsia, nausea, or vomiting.  According

to her, her chest pain is nonexertional.  She denies any dyspnea on

exertion, orthopnea, or PND.  She denies any history of polyuria,

polydipsia, or polyphagia.  She denies any history of hematuria or pyuria.



PAST MEDICAL HISTORY:  Hypertension, hyperthyroidism.  She also has history

of cardiac arrhythmia in the past.



SOCIAL HISTORY:  She is nonsmoker, non-EtOH user.



CURRENT MEDICATIONS:  At home, she is on Inderal and Tapazole.



PHYSICAL EXAMINATION:

GENERAL:  Middle-aged female, in no acute distress.

VITAL SIGNS:  Blood pressure 149/76, pulse 67, respiratory rate 20,

temperature 97.

SKIN:  Dry.  No bruises.  No purpura.  No petechiae.  No ecchymosis.

HEENT:  Atraumatic and normocephalic.  Negative pallor.  Negative jaundice.

Extraocular movements are intact.

NECK:  Supple.  No JVD.  No lymph nodes.  No thyromegaly.  No carotid

bruits.

CHEST WALL:  Bilateral symmetrical expansion.  No tenderness.  No

deformity.

LUNGS:  Clear.  No rales.  No rhonchi.

CARDIOVASCULAR SYSTEM:  PMI not localized.  S1 and S2 regular. 

Tachycardic.

ABDOMEN:  Soft, nontender.  Bowel sounds are positive.

EXTREMITIES:  No clubbing, cyanosis, or edema.

CENTRAL NERVOUS SYSTEM:  The patient is awake, alert, and oriented x3. 

Cranial nerves II through XII are normal.  Power 5/5 x4.  Plantars are

downgoing.



ASSESSMENT:

1.  Chest pain, rule out myocardial infarction, rule out coronary artery

disease, rule out cardiac arrhythmia _____ ischemia.

2.  Poorly controlled hypertension.

3.  Hyperthyroidism.



PLAN:  Admit.  Detailed orders are written.  Seen and examined.







__________________________________________

Odell Earl MD





DD:  03/31/2019 23:37:21

DT:  04/01/2019 0:27:11

Job # [id number]

## 2019-04-02 VITALS — TEMPERATURE: 98 F | SYSTOLIC BLOOD PRESSURE: 130 MMHG | DIASTOLIC BLOOD PRESSURE: 90 MMHG

## 2019-04-02 VITALS — OXYGEN SATURATION: 96 %

## 2019-04-02 VITALS — HEART RATE: 89 BPM

## 2019-04-02 LAB
ALBUMIN SERPL-MCNC: 4.2 {NULL, G/DL} (ref 3.5–5)
ALBUMIN/GLOB SERPL: 1.3 {NULL, NULL} (ref 1–2.1)
ALT SERPL-CCNC: 8 {NULL, U/L} (ref 9–52)
APTT BLD: 33 {NULL, SECONDS} (ref 21–34)
AST SERPL-CCNC: 27 {NULL, U/L} (ref 14–36)
BASOPHILS # BLD AUTO: 0 {NULL, K/UL} (ref 0–0.2)
BASOPHILS NFR BLD: 0.4 {NULL, %} (ref 0–2)
BUN SERPL-MCNC: 10 {NULL, MG/DL} (ref 7–17)
CALCIUM SERPL-MCNC: 9.4 {NULL, MG/DL} (ref 8.6–10.4)
EOSINOPHIL # BLD AUTO: 0.1 {NULL, K/UL} (ref 0–0.7)
EOSINOPHIL NFR BLD: 2.1 {NULL, %} (ref 0–4)
ERYTHROCYTE [DISTWIDTH] IN BLOOD BY AUTOMATED COUNT: 13.1 {NULL, %} (ref 11.5–14.5)
GFR NON-AFRICAN AMERICAN: > 60 {NULL, NULL}
HGB BLD-MCNC: 15.1 {NULL, G/DL} (ref 11–16)
INR PPP: 1.1 {NULL, NULL}
LYMPHOCYTES # BLD AUTO: 2.4 {NULL, K/UL} (ref 1–4.3)
LYMPHOCYTES NFR BLD AUTO: 39.4 {NULL, %} (ref 20–40)
MCH RBC QN AUTO: 33.5 {NULL, PG} (ref 27–31)
MCHC RBC AUTO-ENTMCNC: 34.6 {NULL, G/DL} (ref 33–37)
MCV RBC AUTO: 96.9 {NULL, FL} (ref 81–99)
MONOCYTES # BLD: 0.6 {NULL, K/UL} (ref 0–0.8)
MONOCYTES NFR BLD: 9.2 {NULL, %} (ref 0–10)
NEUTROPHILS # BLD: 2.9 {NULL, K/UL} (ref 1.8–7)
NEUTROPHILS NFR BLD AUTO: 48.9 {NULL, %} (ref 50–75)
NRBC BLD AUTO-RTO: 0.1 {NULL, %} (ref 0–2)
PLATELET # BLD: 204 {NULL, K/UL} (ref 130–400)
PMV BLD AUTO: 8.9 {NULL, FL} (ref 7.2–11.7)
PROTHROMBIN TIME: 12.5 {NULL, SECONDS} (ref 9.7–12.2)
RBC # BLD AUTO: 4.52 {NULL, MIL/UL} (ref 3.8–5.2)
WBC # BLD AUTO: 6 {NULL, K/UL} (ref 4.8–10.8)

## 2019-04-02 PROCEDURE — 4A023N7 MEASUREMENT OF CARDIAC SAMPLING AND PRESSURE, LEFT HEART, PERCUTANEOUS APPROACH: ICD-10-PCS | Performed by: INTERNAL MEDICINE

## 2019-04-02 PROCEDURE — B2111ZZ FLUOROSCOPY OF MULTIPLE CORONARY ARTERIES USING LOW OSMOLAR CONTRAST: ICD-10-PCS | Performed by: INTERNAL MEDICINE

## 2019-04-02 PROCEDURE — B2151ZZ FLUOROSCOPY OF LEFT HEART USING LOW OSMOLAR CONTRAST: ICD-10-PCS | Performed by: INTERNAL MEDICINE

## 2019-04-02 RX ADMIN — ENOXAPARIN SODIUM SCH: 40 INJECTION SUBCUTANEOUS at 11:00

## 2019-04-02 NOTE — CARD
--------------- APPROVED REPORT --------------





Date of service: 04/01/2019



EXAM: Two-dimensional and M-mode echocardiogram with Doppler and 

color Doppler.



Other Information 

Quality : GoodRhythm : 



INDICATION

Peripheral Edema Chest Pain hx of cardiac disease



RISK FACTORS

Hypertension 



2D DIMENSIONS 

IVSd1.8   (0.7-1.1cm)LVDd3.2   (3.9-5.9cm)

PWd1.3   (0.7-1.1cm)LA [id number]   (18-58mL)

LVDs2.3   (2.5-4.0cm)FS (%) 28.3   %

LVEF (%)56.1   (>50%)LVEF (Hayward's)55.05 %

IVC0.00 cm



M-Mode DIMENSIONS 

Left Atrium (MM)3.11   (2.5-4.0cm)IVSd1.24   (0.7-1.1cm)

Aortic Root3.01   (2.2-3.7cm)LVDd3.39   (4.0-5.6cm)

Aortic Cusp Exc.2.07   (1.5-2.0cm)PWd1.44   (0.7-1.1cm)

FS (%) 37   %LVDs2.13   (2.0-3.8cm)

LVEF (%)65   (>50%)



Mitral Valve

MV E Rwlqcsoh78.0cm/sMV A Rbjxvchu42.4cm/sE/A ratio0.6



TDI

Lateral E' Peak V6.29cm/sMedial E' Peak V4.03cm/sE/Lateral E'6.5

E/Medial E'10.2



Tricuspid Valve

TR Peak Hykwtpty491nf/sTR Peak Gr.95biKrZFAB65mkVl



<Conclusion>

Left ventricle: thickness: mild concentric thickeningl; size: normal; 

overall ejection fraction: 65%: 

diastolic filling pressures: normal



Mitral valve: annulus: normal: leaflets: normal : excursion: normal; 

no significant trans-mitral gradient: no significant incompetence: 

left atrium: normal

Aortic valve: leaflets: normal: excursion: normal; no significant 

trans-aortic gradient: No significant incompetence: aortic root: 

normal

Right sided Structures: Pulmonary valve: normal; no significant 

incompetence; Tricuspid valve: normal; mild incompetence:

Intra-cardiac hemodynamics: pulmonary systolic pressures: normal; 

central venous pressures: normal

No pericardial effusion

## 2019-04-02 NOTE — CP.PCM.DIS
Provider





- Provider


Date of Admission: 


03/30/19 16:10





Attending physician: 


Odell Earl MD





Consults: 








03/30/19 07:00


Cardiology Consult Routine 


   Comment: chest pain


   Consulting Provider: Odell Earl


   Consulting Physician: Odell Earl


   Reason for Consult: chest pain





03/30/19 13:28


Cardiology Consult Routine 


   Comment: CHEST PAIN


   Consulting Provider: Len Lux


   Consulting Physician: Len Lux


   Reason for Consult: CHEST PAIN





03/30/19 21:00


Inpatient NP Core Measures Referral Routine 


   Comment: 


   Physician Instructions: 


   Reason For Exam: chest pain











Time Spent in preparation of Discharge (in minutes): 30





Hospital Course





- Lab Results


Lab Results: 


                             Most Recent Lab Values











WBC  6.0 K/uL (4.8-10.8)   04/02/19  07:03    


 


RBC  4.52 Mil/uL (3.80-5.20)   04/02/19  07:03    


 


Hgb  15.1 g/dL (11.0-16.0)   04/02/19  07:03    


 


Hct  43.8 % (34.0-47.0)   04/02/19  07:03    


 


MCV  96.9 fL (81.0-99.0)   04/02/19  07:03    


 


MCH  33.5 pg (27.0-31.0)  H  04/02/19  07:03    


 


MCHC  34.6 g/dL (33.0-37.0)   04/02/19  07:03    


 


RDW  13.1 % (11.5-14.5)   04/02/19  07:03    


 


Plt Count  204 K/uL (130-400)   04/02/19  07:03    


 


MPV  8.9 fL (7.2-11.7)   04/02/19  07:03    


 


Neut % (Auto)  48.9 % (50.0-75.0)  L  04/02/19  07:03    


 


Lymph % (Auto)  39.4 % (20.0-40.0)   04/02/19  07:03    


 


Mono % (Auto)  9.2 % (0.0-10.0)   04/02/19  07:03    


 


Eos % (Auto)  2.1 % (0.0-4.0)   04/02/19  07:03    


 


Baso % (Auto)  0.4 % (0.0-2.0)   04/02/19  07:03    


 


Neut # (Auto)  2.9 K/uL (1.8-7.0)   04/02/19  07:03    


 


Lymph # (Auto)  2.4 K/uL (1.0-4.3)   04/02/19  07:03    


 


Mono # (Auto)  0.6 K/uL (0.0-0.8)   04/02/19  07:03    


 


Eos # (Auto)  0.1 K/uL (0.0-0.7)   04/02/19  07:03    


 


Baso # (Auto)  0.0 K/uL (0.0-0.2)   04/02/19  07:03    


 


PT  12.5 SECONDS (9.7-12.2)  H  04/02/19  07:03    


 


INR  1.1   04/02/19  07:03    


 


APTT  33 SECONDS (21-34)   04/02/19  07:03    


 


Sodium  137 mmol/L (132-148)   04/02/19  07:03    


 


Potassium  4.0 mmol/L (3.6-5.2)   04/02/19  07:03    


 


Chloride  101 mmol/L ()   04/02/19  07:03    


 


Carbon Dioxide  27 mmol/L (22-30)   04/02/19  07:03    


 


Anion Gap  13  (10-20)   04/02/19  07:03    


 


BUN  10 mg/dL (7-17)   04/02/19  07:03    


 


Creatinine  0.6 mg/dL (0.7-1.2)  L  04/02/19  07:03    


 


Est GFR ( Amer)  > 60   04/02/19  07:03    


 


Est GFR (Non-Af Amer)  > 60   04/02/19  07:03    


 


Random Glucose  104 mg/dL ()   04/02/19  07:03    


 


Calcium  9.4 mg/dl (8.6-10.4)   04/02/19  07:03    


 


Total Bilirubin  0.5 mg/dL (0.2-1.3)   04/02/19  07:03    


 


AST  27 U/L (14-36)   04/02/19  07:03    


 


ALT  8 U/L (9-52)  L  04/02/19  07:03    


 


Alkaline Phosphatase  93 U/L ()   04/02/19  07:03    


 


Troponin I  < 0.0120 ng/mL (0.00-0.120)   03/30/19  16:58    


 


Total Protein  7.5 g/dL (6.3-8.3)   04/02/19  07:03    


 


Albumin  4.2 g/dL (3.5-5.0)   04/02/19  07:03    


 


Globulin  3.2 gm/dL (2.2-3.9)   04/02/19  07:03    


 


Albumin/Globulin Ratio  1.3  (1.0-2.1)   04/02/19  07:03    


 


Beta HCG, Quant  < 2.39 mIU/ML  04/02/19  07:03    














Discharge Plan





- Discharge Medications


Prescriptions: 


Losartan [Cozaar] 50 mg PO DAILY #30 tab


Rosuvastatin Calcium [Crestor] 10 mg PO HS 30 Days  tab


Aspirin [Ecotrin] 81 mg PO DAILY 30 Days  tabec


methIMAzole [Tapazole] 10 mg PO BID 30 Days #60 tab





- Follow Up Plan


Condition: STABLE


Disposition: HOME/ ROUTINE


Instructions:  Chest Pain (DC), Aspirin, Losartan, Methimazole, Rosuvastatin


Additional Instructions: 


FOLLOW UP WITH DR EARL IN HIS OFFICE ----CALL FOR APPOINTMENT


FOLLOW UP WITH DR LUX IN HIS OFFICE ----CALL FOR APPOINTMENT


CONTINUE HOME MEDICATION 


NEW PRESCRIPTION GIVEN 


   CRESTOR 20 MG PO DAILY 


   LOSARTAN 50 MG PO DAILY 


   ASPIRIN 81 MG PO DAILY 


ACTIVITY AS TOLERATED


CALL DR EARL OR GO TO THE EMERGENCY ROOM IF SYMPTOM RETURN OR WORSENING





SEGUIRSE CON EL DR EARL EN MURPHY OFICINA ---- LLAME PARA SOLICITAR CATARINA MATI


SEGUIRSE CON EL DR. LUX EN MURPHY OFICINA ---- LLAME PARA SOLICITAR CATARINA MATI


CONTINUAR MEDICAMENTO EN CASA


NUEVA RECETA KEMAR


CRESTOR 20 MG PO DIARIO


LOSARTAN 50 MG PO DIARIO


ASPIRIN 81 MG PO DIARIO


LA ACTIVIDAD MARY TOLERADA


LLAME AL DR. EARL O VAYA A LA JOSE DE EMERGENCIA SI EL SNTOMA DEVUELVE O 

CONSIDERA


Referrals: 


Len Lux MD [Staff Provider] - 


Odell Earl MD [Staff Provider] -

## 2019-04-02 NOTE — CP.PCM.PN
Subjective





- Date & Time of Evaluation


Date of Evaluation: 04/02/19


Time of Evaluation: 17:01





- Subjective


Subjective: 





PATIENT SEEN AND EXAMINED AT THE BEDSIDE





Objective





- Vital Signs/Intake and Output


Vital Signs (last 24 hours): 


                                        











Temp Pulse Resp BP Pulse Ox


 


 98 F   89   20   130/90   96 


 


 04/02/19 14:50  04/02/19 16:00  04/02/19 14:50  04/02/19 14:50  04/02/19 14:50











- Medications


Medications: 


                               Current Medications





Enoxaparin Sodium (Lovenox)  40 mg SC DAILY [facility]


   Last Admin: 04/02/19 11:00 Dose:  Not Given


Losartan Potassium (Cozaar)  50 mg PO DAILY [facility]


   Last Admin: 04/02/19 11:00 Dose:  Not Given


Methimazole (Tapazole)  10 mg PO BID [facility]


   Last Admin: 04/02/19 11:00 Dose:  Not Given


Propranolol HCl (Inderal)  20 mg PO BID [facility]


   Last Admin: 04/02/19 11:00 Dose:  Not Given


Rosuvastatin Calcium (Crestor)  10 mg PO HS [facility]


   Last Admin: 04/01/19 21:40 Dose:  10 mg











- Labs


Labs: 


                                        





                                 04/02/19 07:03 





                                 04/02/19 07:03 





                                        











PT  12.5 SECONDS (9.7-12.2)  H  04/02/19  07:03    


 


INR  1.1   04/02/19  07:03    


 


APTT  33 SECONDS (21-34)   04/02/19  07:03    














Assessment and Plan





- Assessment and Plan (Free Text)


Assessment: 





FOLLOW UP WITH DR JASMINE IN HIS OFFICE ----CALL FOR APPOINTMENT


FOLLOW UP WITH DR SYLVESTER IN HIS OFFICE ----CALL FOR APPOINTMENT


CONTINUE HOME MEDICATION 


NEW PRESCRIPTION GIVEN 


   CRESTOR 20 MG PO DAILY 


   LOSARTAN 50 MG PO DAILY 


   ASPIRIN 81 MG PO DAILY 


ACTIVITY AS TOLERATED


CALL DR NEUMANN OR GO TO THE EMERGENCY ROOM IF SYMPTOM RETURN OR WORSENING

## 2019-04-02 NOTE — PN
DATE:  04/01/2019



SUBJECTIVE:  The patient, Milana, is for cardiac catheterization

tomorrow.  She has on and off chest pain, palpitation, weakness, and

dizziness.  Blood pressure is still poorly controlled.



PHYSICAL EXAMINATION:

VITAL SIGNS:  Blood pressure 164/116, pulse 79, respiratory rate 20,

temperature 97.3.

LUNGS:  Clear.

CARDIOVASCULAR SYSTEM:  S1 and S2, regular.

ABDOMEN:  Soft, nontender.  Bowel sounds are positive.



ASSESSMENT:

1.  Chest pain.  Rule out coronary artery disease.  The patient is for

cardiac catheterization.

2.  Poorly controlled hypertension.

3.  Hyperthyroidism.



PLAN:  _____ blood pressure medication.  Cardiology and cardiac cath in the

a.m.  Monitor the patient.





__________________________________________

Odell Earl MD





DD:  04/01/2019 22:11:11

DT:  04/02/2019 0:09:04

Job # [id number]

## 2019-04-02 NOTE — CP.PCM.PN
Subjective





- Date & Time of Evaluation


Date of Evaluation: 04/02/19


Time of Evaluation: 12:31





- Subjective


Subjective: 





Patient s/p Cath


Normal Coronaries


Normal EF





Medical management


cardiac point of view cleared for discharge after 4pm today





Objective





- Vital Signs/Intake and Output


Vital Signs (last 24 hours): 


                                        











Temp Pulse Resp BP Pulse Ox


 


 98.0 F   73   20   136/91 H  96 


 


 04/02/19 07:00  04/02/19 07:40  04/02/19 07:00  04/02/19 07:00  04/02/19 07:00











- Medications


Medications: 


                               Current Medications





Enoxaparin Sodium (Lovenox)  40 mg SC DAILY [facility]


   Last Admin: 04/02/19 11:00 Dose:  Not Given


Losartan Potassium (Cozaar)  50 mg PO DAILY [facility]


   Last Admin: 04/02/19 11:00 Dose:  Not Given


Methimazole (Tapazole)  10 mg PO BID [facility]


   Last Admin: 04/02/19 11:00 Dose:  Not Given


Propranolol HCl (Inderal)  20 mg PO BID [facility]


   Last Admin: 04/02/19 11:00 Dose:  Not Given


Rosuvastatin Calcium (Crestor)  10 mg PO HS [facility]


   Last Admin: 04/01/19 21:40 Dose:  10 mg











- Labs


Labs: 


                                        





                                 04/02/19 07:03 





                                 04/02/19 07:03 





                                        











PT  12.5 SECONDS (9.7-12.2)  H  04/02/19  07:03    


 


INR  1.1   04/02/19  07:03    


 


APTT  33 SECONDS (21-34)   04/02/19  07:03

## 2019-04-03 NOTE — DS
DISCHARGE DIAGNOSES:

1.  Noncoronary chest pain.

2.  Hypertension.

3.  Hyperthyroidism.



HISTORY OF PRESENT ILLNESS:  This is a 48-year-old  female with

history of hyperthyroidism, hypertension, hyperlipidemia, who was admitted

with chest pain.  MI was ruled out but she does have negative cardiac

enzymes.  She underwent stress test.  _____ possible.  She underwent

cardiac catheterization which is normal.



CONDITION UPON DISCHARGE:  Stable.  She is being discharged for outpatient

followup.  She will be followed up by me as outpatient.





__________________________________________

Odell Earl MD





DD:  04/02/2019 21:51:00

DT:  04/02/2019 23:28:49

Job # [id number]

## 2019-04-07 ENCOUNTER — HOSPITAL ENCOUNTER (EMERGENCY)
Dept: HOSPITAL 31 - C.ER | Age: 49
Discharge: HOME | End: 2019-04-07
Payer: COMMERCIAL

## 2019-04-07 VITALS
DIASTOLIC BLOOD PRESSURE: 91 MMHG | TEMPERATURE: 98.1 F | RESPIRATION RATE: 16 BRPM | HEART RATE: 68 BPM | SYSTOLIC BLOOD PRESSURE: 168 MMHG | OXYGEN SATURATION: 97 %

## 2019-04-07 DIAGNOSIS — L50.0: Primary | ICD-10-CM

## 2019-04-07 PROCEDURE — 96372 THER/PROPH/DIAG INJ SC/IM: CPT

## 2019-04-07 PROCEDURE — 99283 EMERGENCY DEPT VISIT LOW MDM: CPT

## 2019-04-07 NOTE — C.PDOC
History Of Present Illness


49-year-old female presents to the emergency department with complaints of rack 

to her chest and back for the past several hours. Patient states that the rash 

is very itchy but she denies fever or shortness of breath. Patient does not 

recall any new food or environment that would cause such a reaction.


Time Seen by Provider: 04/07/19 00:30


Chief Complaint (Nursing): Allergic Reaction


History Per: Patient


History/Exam Limitations: no limitations


Onset/Duration Of Symptoms: Hrs


Current Symptoms Are (Timing): Still Present


Possible Cause: Unknown


Associated Symptoms: Skin Rash





Past Medical History


Reviewed: Historical Data, Nursing Documentation, Vital Signs


Vital Signs: 





                                Last Vital Signs











Temp  98.1 F   04/07/19 00:31


 


Pulse  68   04/07/19 00:31


 


Resp  16   04/07/19 00:31


 


BP  168/91 H  04/07/19 00:31


 


Pulse Ox  97   04/07/19 00:31














- Medical History


PMH: Cardia Arrhythmia, HTN, Hyperthyroidism, Peripheral Edema


   Denies: Asthma, Atrial Fibrillation, Bronchitis, CHF, Mitral Valve Prolapse


Surgical History: No Surg Hx


   Denies: Pacemaker





- [facility] Procedures











FLUOROSCOPY OF LEFT HEART USING LOW OSMOLAR CONTRAST (03/30/19)


FLUOROSCOPY OF MULT COR ART USING L OSM CONTRAST (03/30/19)


MEASURE OF CARDIAC SAMPL & PRESSURE, L HEART, PERC APPROACH (03/30/19)








Family History: States: No Known Family Hx





- Social History


Hx Tobacco Use: No


Hx Alcohol Use: No


Hx Substance Use: No





- Immunization History


Hx Tetanus Toxoid Vaccination: Yes


Hx Influenza Vaccination: Yes (2016)


Hx Pneumococcal Vaccination: No





Review Of Systems


Except As Marked, All Systems Reviewed And Found Negative.


Constitutional: Negative for: Fever, Chills


Cardiovascular: Negative for: Chest Pain


Respiratory: Negative for: Cough, Shortness of Breath


Gastrointestinal: Negative for: Nausea, Vomiting, Abdominal Pain, Diarrhea


Skin: Positive for: Rash


Neurological: Negative for: Weakness, Numbness





Physical Exam





- Physical Exam


Appears: Non-toxic, No Acute Distress


Skin: Warm, Dry, Rash (maculopapular rash to chest and back)


Head: Atraumatic, Normacephalic


Eye(s): bilateral: Normal Inspection, PERRL, EOMI


Nose: Normal


Oral Mucosa: Moist


Tongue: Normal Appearing, No Swelling


Lips: Normal Appearing, No Swelling


Throat: Normal, No Erythema, No Exudate


Neck: Normal, Supple


Chest: Symmetrical, No Tenderness


Cardiovascular: Rhythm Regular, No Murmur


Respiratory: Normal Breath Sounds, No Rales, No Rhonchi, No Stridor, No Wheezing


Gastrointestinal/Abdominal: Soft, No Tenderness


Extremity: Normal ROM


Neurological/Psych: Oriented x3, Normal Speech, Normal Cognition





ED Course And Treatment


O2 Sat by Pulse Oximetry: 97 (RA)


Pulse Ox Interpretation: Normal





Medical Decision Making


Medical Decision Making: 


Plan:


Decadron 10mg IM





Disposition





- Disposition


[facility] 


[facility] 


Disposition: HOME/ ROUTINE


Disposition Time: 00:40


Condition: GOOD


Additional Instructions: 





YU FRIAS, thank you for letting us take care of you today. The emergency

medical care you received today was directed at your acute symptoms. If you were

prescribed any medication, please fill it and take as directed. It may take 

several days for your symptoms to resolve. Return to the Emergency Department if

your symptoms worsen, do not improve, or if you have any other problems.





Please contact your doctor or call one of the physicians/clinics you have been 

referred to that are listed on the Patient Visit Information form that is 

included in your discharge packet. Bring any paperwork you were given at 

discharge with you along with any medications you are taking to your follow up 

visit. Our treatment cannot replace ongoing medical care by a primary care 

provider outside of the emergency department.





Thank you for allowing the [facility] team to be part of your care today.

















Follow up with your primary care doctor in 2-3 days for re-evaluation and 

further management.











Return to the emergency room if you have any concerns.








YU FRIAS, shade por dejarnos cuidar de usted hoy. La atencin mdica 

de emergencia que recibi hoy se dirigi a kim sntomas agudos. Si le recetaron 

algn medicamento, llnelo y tmelo segn las indicaciones. Los sntomas pueden 

tardar varios donnelly en resolverse. Regrese al Departamento de Emergencias si kim 

sntomas empeoran, no mejoran o si tiene otros problemas.





Comunquese con mota mdico o llame a randal de los mdicos / clnicas a los que ha 

sido referido que figuran en el formulario de Informacin de visita al paciente 

que se incluye en mota paquete de rome. Lleve todos los documentos que le 

entregaron al momento del rome junto con todos los medicamentos que est tomando

para mota visita de seguimiento. Nuestro tratamiento no puede reemplazar la 

atencin mdica continua por un proveedor de atencin primaria fuera del 

departamento de emergencias.





Shade por permitir que el equipo de [facility] sea parte de mota atencin 

hoy.

















Nicole un seguimiento con mota mdico de atencin primaria en 2-3 donnelly para jasper 

reevaluacin y manejo adicional.











Regrese a la nurys de emergencias si tiene alguna inquietud.


Prescriptions: 


predniSONE [Prednisone] 40 mg PO DAILY #10 tab


Instructions:  Hives (DC)


Forms:  StartSpanish (Czech)


Print Language: Mongolian





- Clinical Impression


Clinical Impression: 


 Allergic urticaria








- Scribe Statement


The provider has reviewed the documentation as recorded by the Scribe (Chin Rai)


Provider Attestation: 


All medical record entries made by the Scribe were at my direction and 

personally dictated by me. I have reviewed the chart and agree that the record 

accurately reflects my personal performance of the history, physical exam, 

medical decision making, and the department course for this patient. I have also

 personally directed, reviewed, and agree with the discharge instructions and 

disposition.